# Patient Record
Sex: FEMALE | Race: BLACK OR AFRICAN AMERICAN | NOT HISPANIC OR LATINO | URBAN - METROPOLITAN AREA
[De-identification: names, ages, dates, MRNs, and addresses within clinical notes are randomized per-mention and may not be internally consistent; named-entity substitution may affect disease eponyms.]

---

## 2019-04-16 ENCOUNTER — INPATIENT (INPATIENT)
Facility: HOSPITAL | Age: 29
LOS: 2 days | Discharge: HOME | End: 2019-04-19
Attending: INTERNAL MEDICINE | Admitting: INTERNAL MEDICINE
Payer: COMMERCIAL

## 2019-04-16 VITALS
HEART RATE: 76 BPM | DIASTOLIC BLOOD PRESSURE: 58 MMHG | TEMPERATURE: 98 F | OXYGEN SATURATION: 94 % | RESPIRATION RATE: 20 BRPM | SYSTOLIC BLOOD PRESSURE: 113 MMHG

## 2019-04-16 DIAGNOSIS — Z90.49 ACQUIRED ABSENCE OF OTHER SPECIFIED PARTS OF DIGESTIVE TRACT: Chronic | ICD-10-CM

## 2019-04-16 DIAGNOSIS — Z90.81 ACQUIRED ABSENCE OF SPLEEN: Chronic | ICD-10-CM

## 2019-04-16 LAB
ABO RH CONFIRMATION: SIGNIFICANT CHANGE UP
ALBUMIN SERPL ELPH-MCNC: 4.6 G/DL — SIGNIFICANT CHANGE UP (ref 3.5–5.2)
ALP SERPL-CCNC: 81 U/L — SIGNIFICANT CHANGE UP (ref 30–115)
ALT FLD-CCNC: 99 U/L — HIGH (ref 0–41)
ANION GAP SERPL CALC-SCNC: 15 MMOL/L — HIGH (ref 7–14)
APPEARANCE UR: ABNORMAL
AST SERPL-CCNC: 84 U/L — HIGH (ref 0–41)
BACTERIA # UR AUTO: ABNORMAL /HPF
BILIRUB DIRECT SERPL-MCNC: 5.3 MG/DL — HIGH (ref 0–0.2)
BILIRUB INDIRECT FLD-MCNC: 5.1 MG/DL — HIGH (ref 0.2–1.2)
BILIRUB SERPL-MCNC: 10.4 MG/DL — HIGH (ref 0.2–1.2)
BILIRUB UR-MCNC: ABNORMAL
BLD GP AB SCN SERPL QL: SIGNIFICANT CHANGE UP
BUN SERPL-MCNC: 9 MG/DL — LOW (ref 10–20)
CALCIUM SERPL-MCNC: 9.3 MG/DL — SIGNIFICANT CHANGE UP (ref 8.5–10.1)
CHLORIDE SERPL-SCNC: 103 MMOL/L — SIGNIFICANT CHANGE UP (ref 98–110)
CO2 SERPL-SCNC: 22 MMOL/L — SIGNIFICANT CHANGE UP (ref 17–32)
COLOR SPEC: ABNORMAL
CREAT SERPL-MCNC: 0.5 MG/DL — LOW (ref 0.7–1.5)
DIFF PNL FLD: ABNORMAL
EPI CELLS # UR: ABNORMAL /HPF
GLUCOSE SERPL-MCNC: 88 MG/DL — SIGNIFICANT CHANGE UP (ref 70–99)
GLUCOSE UR QL: NEGATIVE MG/DL — SIGNIFICANT CHANGE UP
HCT VFR BLD CALC: 23.9 % — LOW (ref 37–47)
HGB BLD-MCNC: 8.7 G/DL — LOW (ref 12–16)
KETONES UR-MCNC: NEGATIVE — SIGNIFICANT CHANGE UP
LACTATE SERPL-SCNC: 0.8 MMOL/L — SIGNIFICANT CHANGE UP (ref 0.5–2.2)
LEUKOCYTE ESTERASE UR-ACNC: NEGATIVE — SIGNIFICANT CHANGE UP
MCHC RBC-ENTMCNC: 36.4 G/DL — SIGNIFICANT CHANGE UP (ref 32–37)
MCHC RBC-ENTMCNC: 37.2 PG — HIGH (ref 27–31)
MCV RBC AUTO: 102.1 FL — HIGH (ref 81–99)
NITRITE UR-MCNC: NEGATIVE — SIGNIFICANT CHANGE UP
NRBC # BLD: 8 /100 WBCS — HIGH (ref 0–0)
PH UR: 6.5 — SIGNIFICANT CHANGE UP (ref 5–8)
PLATELET # BLD AUTO: 437 K/UL — HIGH (ref 130–400)
POTASSIUM SERPL-MCNC: 4.7 MMOL/L — SIGNIFICANT CHANGE UP (ref 3.5–5)
POTASSIUM SERPL-SCNC: 4.7 MMOL/L — SIGNIFICANT CHANGE UP (ref 3.5–5)
PROT SERPL-MCNC: 6.9 G/DL — SIGNIFICANT CHANGE UP (ref 6–8)
PROT UR-MCNC: NEGATIVE MG/DL — SIGNIFICANT CHANGE UP
RBC # BLD: 2.34 M/UL — LOW (ref 4.2–5.4)
RBC # BLD: 2.34 M/UL — LOW (ref 4.2–5.4)
RBC # FLD: 18.7 % — HIGH (ref 11.5–14.5)
RBC CASTS # UR COMP ASSIST: ABNORMAL /HPF
RETICS #: 572.6 K/UL — HIGH (ref 25–125)
RETICS/RBC NFR: 24.5 % — HIGH (ref 0.5–1.5)
SODIUM SERPL-SCNC: 140 MMOL/L — SIGNIFICANT CHANGE UP (ref 135–146)
SP GR SPEC: 1.01 — SIGNIFICANT CHANGE UP (ref 1.01–1.03)
TYPE + AB SCN PNL BLD: SIGNIFICANT CHANGE UP
UROBILINOGEN FLD QL: >=8 MG/DL (ref 0.2–0.2)
WBC # BLD: 10.97 K/UL — HIGH (ref 4.8–10.8)
WBC # FLD AUTO: 10.97 K/UL — HIGH (ref 4.8–10.8)
WBC UR QL: SIGNIFICANT CHANGE UP /HPF

## 2019-04-16 PROCEDURE — 99285 EMERGENCY DEPT VISIT HI MDM: CPT

## 2019-04-16 PROCEDURE — 76705 ECHO EXAM OF ABDOMEN: CPT | Mod: 26

## 2019-04-16 PROCEDURE — 93010 ELECTROCARDIOGRAM REPORT: CPT

## 2019-04-16 PROCEDURE — 71046 X-RAY EXAM CHEST 2 VIEWS: CPT | Mod: 26

## 2019-04-16 RX ORDER — SODIUM CHLORIDE 9 MG/ML
1000 INJECTION, SOLUTION INTRAVENOUS
Qty: 0 | Refills: 0 | Status: DISCONTINUED | OUTPATIENT
Start: 2019-04-16 | End: 2019-04-17

## 2019-04-16 RX ORDER — SODIUM CHLORIDE 9 MG/ML
1000 INJECTION INTRAMUSCULAR; INTRAVENOUS; SUBCUTANEOUS ONCE
Qty: 0 | Refills: 0 | Status: COMPLETED | OUTPATIENT
Start: 2019-04-16 | End: 2019-04-16

## 2019-04-16 RX ORDER — KETOROLAC TROMETHAMINE 30 MG/ML
15 SYRINGE (ML) INJECTION ONCE
Qty: 0 | Refills: 0 | Status: DISCONTINUED | OUTPATIENT
Start: 2019-04-16 | End: 2019-04-16

## 2019-04-16 RX ADMIN — SODIUM CHLORIDE 1000 MILLILITER(S): 9 INJECTION INTRAMUSCULAR; INTRAVENOUS; SUBCUTANEOUS at 21:00

## 2019-04-16 RX ADMIN — SODIUM CHLORIDE 100 MILLILITER(S): 9 INJECTION, SOLUTION INTRAVENOUS at 22:05

## 2019-04-16 RX ADMIN — SODIUM CHLORIDE 1000 MILLILITER(S): 9 INJECTION INTRAMUSCULAR; INTRAVENOUS; SUBCUTANEOUS at 20:00

## 2019-04-16 RX ADMIN — Medication 15 MILLIGRAM(S): at 23:35

## 2019-04-16 NOTE — ED PROVIDER NOTE - PROGRESS NOTE DETAILS
Care d/w Heme fellow and requesting IV fluids and analgesia as needed. Dr Faizan mitchell. Hospitalist and MAR aware. Dr Tran requesting RUQ US and will f/u by Med team

## 2019-04-16 NOTE — H&P ADULT - NSHPLABSRESULTS_GEN_ALL_CORE
8.7    10.97  )----------(  437       ( 16 Apr 2019 18:18 )               23.9     Retic Count: 24.5      04-16    140  |  103  |  9<L>  ----------------------------<  88  4.7   |  22  |  0.5<L>    Ca    9.3      16 Apr 2019 18:18    TPro  6.9  /  Alb  4.6  /  TBili  10.4<H>  /  DBili  5.3<H>  /  AST  84<H>  /  ALT  99<H>  /  AlkPhos  81  04-16

## 2019-04-16 NOTE — ED PROVIDER NOTE - CLINICAL SUMMARY MEDICAL DECISION MAKING FREE TEXT BOX
28f w HbSS and new jaundice concerning for hemolytic crisis. nontoxic appearing, n/v intact. No pain at this time. Labs, EKG, & imaging reviewed. IV fluids given. Hematology consulted. Patient admitted for further care and management.

## 2019-04-16 NOTE — ED PROVIDER NOTE - CARE PLAN
Principal Discharge DX:	Jaundice  Secondary Diagnosis:	Hb-SS disease with crisis Principal Discharge DX:	Jaundice  Assessment and plan of treatment:	28f w HbSS and new jaundice concerning for hemolytic crisis. nontoxic appearing, n/v intact. No pain at this time. Hx of cholecystectomy & no abd tender/pain. --Labs, EKG, CXR. --Will d/w Hematology, admit  Secondary Diagnosis:	Hb-SS disease with crisis

## 2019-04-16 NOTE — ED PROVIDER NOTE - OBJECTIVE STATEMENT
28f w a hx of HbSS reports 1wk of jaundice while traveling to [place]. Pt also reporting feeling tired, dark urine, and generalized weakness. Symptoms are moderate, constant, no exacerbating/alleviating. No similar prior.

## 2019-04-16 NOTE — H&P ADULT - ASSESSMENT
Patient is a 29 yo F with PMHx of Sickle Cell Disease presenting with complaints of painless jaundice.     #) Patient is a 29 yo F with PMHx of Sickle Cell Disease presenting with complaints of painless jaundice.     #) Jaundice likely secondary to Acute Sickle Crisis without Vaso-occlusive symptoms  - Patient presenting with diffuse jaundice and total T bili of 10 with elevation in both direct and indirect bili  - Retic percent of 24.5 with absolute retic count 13.9 showing adequate response   - US of liver preliminarily shows no acute findngs, follow up official read   - Aggressive fluid hydration   - Hematology evaluation   - Monitor hemoglobin   - Maintain Active type and screen    #Diet; Regular  #Activity: Ambulate as tolerated  #DVT PPx: Encourage Ambulation   #GI PPx: Not indicated   #Dispo: Pending

## 2019-04-16 NOTE — ED ADULT TRIAGE NOTE - CHIEF COMPLAINT QUOTE
pt here medical evaluatio hx of Sickle Cell. recently travelled from [facility] arrived on Saturday. Urine is dark as per pt and eyes appear jaundiced in ER. denies n/v . reports constipation. pt here medical evaluation hx of Sickle Cell. recently travelled from [place] arrived on Saturday. Urine is dark as per pt and eyes appear jaundiced in ER. denies n/v/d/fevers. reports constipation.

## 2019-04-16 NOTE — ED PROVIDER NOTE - PLAN OF CARE
28f w HbSS and new jaundice concerning for hemolytic crisis. nontoxic appearing, n/v intact. No pain at this time. Hx of cholecystectomy & no abd tender/pain. --Labs, EKG, CXR. --Will d/w Hematology, admit

## 2019-04-16 NOTE — H&P ADULT - ATTENDING COMMENTS
Patient is a 27 yo F with PMHx of Sickle Cell Disease presenting with complaints of painless jaundice. Per patient, she recently went on a trip to [facility]. She was there for about 6 days. She says that when she first arrived, she began to notice she had some mild yellowing of her eyes which she has had before. She did not think much of it but notes she was progressively getting more fatigued throughout her trip. She then came back and began to note that her eyes became even more yellow in color as well as her skin. She also notes very dark colored urine but otherwise denies any urinary complaints. She also began to have pruritis. Therefore, she came in for further evaluation. On arrival to the ED, she was found on blood work to have a Hb of 8.7, retic count of 24.5, Total Bili of 10 with both Indirect and Direct Bili of approx 5. She denies any abdominal pain, fevers, chills, chest pain, cough, wheezing, shortness of breath or any other complaints except for some jaw pain which she contributes to her TMJ. She denies any sort of tick or mosquito bite that she is aware of while traveling.     REVIEW OF SYSTEMS: see cc/HPI  CONSTITUTIONAL: No weakness, fevers or chills  EYES/ENT: No visual changes;  No vertigo or throat pain , Scleral juandice - intensified   NECK: No pain or stiffness  RESPIRATORY: No cough, wheezing, hemoptysis; No shortness of breath  CARDIOVASCULAR: No chest pain or palpitations  GASTROINTESTINAL: No abdominal or epigastric pain. No nausea, vomiting, or hematemesis; No diarrhea or constipation. No melena or hematochezia.  GENITOURINARY: No dysuria, frequency or hematuria  NEUROLOGICAL: No numbness or weakness  SKIN: No itching, rashes    Physical Exam:  General: WN/WD NAD  Neurology: A&Ox3, nonfocal, follows commands  Eyes: PERRLA/ EOMI, Scleral jaundice  ENT/Neck: Neck supple, trachea midline, No JVD  Respiratory: CTA B/L, No wheezing, rales, rhonchi  CV: Normal rate regular rhythm, S1S2, no murmurs, rubs or gallops  Abdominal: Soft, NT, ND +BS,   Extremities: No edema, + peripheral pulses  Skin: No Rashes, Hematoma, Ecchymosis  Incisions:   Tubes:  A/P  Elevated Bilirubin in patient w/ HbSS r/o hemolysis   -IV fluids   -serial CBC / Retic / Peripheral smear  -LDH/ Haptoglobin / Type and Screen  -Hematology eval    Macrocytosis - possibly 2/2 high retic % r/o Vit B12 def  -B12 level    DVT prophylaxis

## 2019-04-16 NOTE — ED ADULT NURSE NOTE - OBJECTIVE STATEMENT
Pt complains of dark colored urine and yellow sclera x1 week. Pt has a history of sickle  cell anemia and also complains of feeling tired. [place] on [place].

## 2019-04-16 NOTE — ED PROVIDER NOTE - NS ED ROS FT
Review of Systems  Constitutional:  No fever or chills. +Generalized weakness  Eyes:  No visual changes, eye pain, or discharge.  ENMT:  No nasal congestion, discharge, or throat pain.   Cardiac:  No chest pain, syncope, or edema.  Respiratory:  No dyspnea, wheezing, or cough. No hemoptysis.  GI:  No vomiting, diarrhea, or abdominal pain. No melena or hematochezia.  :  No dysuria or hematuria.   Musculoskeletal:  No gait abnormality, joint swelling, joint pain, or back pain.  Skin:  +Jaundice  Neuro:  No headache, loss of sensation, or focal weakness.  No change in mental status.

## 2019-04-16 NOTE — H&P ADULT - HISTORY OF PRESENT ILLNESS
Patient is a 29 yo F with PMHx of Sickle Cell Disease presenting with complaints of painless jaundice. Per patient, she recently went on a trip to [facility]. She was there for about 6 days. She says that when she first arrived, she began to notice she had some mild yellowing of her eyes which she has had before. She did not think much of it but notes she was progressively getting more fatigued throughout her trip. She then came back and began to note that her eyes became even more yellow in color as well as her skin. She also notes very dark colored urine but otherwise denies any urinary complaints. She also began to have pruritis. Therefore, she came in for further evaluation. On arrival to the ED, she was found on blood work to have a Hb of 8.7, retic count of 24.5, Total Bili of 10 with both Indirect and Direct Bili of approx 5. She denies any abdominal pain, fevers, chills, chest pain, cough, wheezing, shortness of breath or any other complaints except for some jaw pain which she contributes to her TMJ. She denies any sort of tick or mosquito bite that she is aware of while traveling.

## 2019-04-16 NOTE — H&P ADULT - NSHPPHYSICALEXAM_GEN_ALL_CORE
T(C): 36.8 (16 Apr 2019 21:15), Max: 36.8 (16 Apr 2019 21:15)  T(F): 98.2 (16 Apr 2019 21:15), Max: 98.2 (16 Apr 2019 21:15)  HR: 70 (16 Apr 2019 21:15) (65 - 76)  BP: 117/69 (16 Apr 2019 21:15) (113/58 - 137/65)  RR: 18 (16 Apr 2019 21:15) (18 - 20)  SpO2: 98% (16 Apr 2019 21:15) (94% - 98%)    General: well developed, well nourished, NAD  Neuro: alert and oriented, no focal deficits, moves all extremities spontaneously  HEENT: NCAT, EOMI, anicteric, mucosa moist  Respiratory: airway patent, respirations unlabored  CVS: regular rate and rhythm  Abdomen: soft, nontender, nondistended  Extremities: no edema, sensation and movement grossly intact  Skin: diffuse jaundice T(C): 36.8 (16 Apr 2019 21:15), Max: 36.8 (16 Apr 2019 21:15)  T(F): 98.2 (16 Apr 2019 21:15), Max: 98.2 (16 Apr 2019 21:15)  HR: 70 (16 Apr 2019 21:15) (65 - 76)  BP: 117/69 (16 Apr 2019 21:15) (113/58 - 137/65)  RR: 18 (16 Apr 2019 21:15) (18 - 20)  SpO2: 98% (16 Apr 2019 21:15) (94% - 98%)    General: well developed, well nourished, NAD  Neuro: alert and oriented, no focal deficits, moves all extremities spontaneously  HEENT: NCAT, EOMI, anicteric, mucosa moist  Respiratory: CTABL   CVS: regular rate and rhythm  Abdomen: soft, nontender, nondistended  Extremities: no edema, sensation and movement grossly intact  Skin: diffuse jaundice

## 2019-04-16 NOTE — ED PROVIDER NOTE - PHYSICAL EXAMINATION
Physical Exam  General: Awake, alert, NAD, WDWN, non-toxic appearing, NCAT  Eyes: PERRL, EOMI, +scleral icterus, lids are normal  ENT: External inspection normal, pink/moist membranes, no pharyngeal erythema/exudate  CV: S1S2, regular rate and rhythm, no murmur/gallops/rubs, no JVD, 2+ pulses b/l, no edema/cords/homans, warm/well-perfused  Respiratory: Normal respiratory rate/effort, no respiratory distress, normal voice, speaking full sentences, lungs clear to auscultation b/l, no wheezing/rales/rhonchi, no retractions, no stridor  Abdomen: Soft abdomen, no tender/distended/guarding/rebound, no CVA tender, neg Maxwell.  Musculoskeletal: FROM all 4 extremities, N/V intact, no cas tender/deform  Neck: FROM neck, supple, no meningismus, trachea midline, no JVD  Integumentary: Color normal for race, warm and dry, +scleral icterus  Neuro: Oriented x3, CN 2-12 grossly intact, normal motor, normal sensory  Psych: Oriented x3, mood normal, affect normal

## 2019-04-17 LAB
ALBUMIN SERPL ELPH-MCNC: 3.8 G/DL — SIGNIFICANT CHANGE UP (ref 3.5–5.2)
ALBUMIN SERPL ELPH-MCNC: 3.8 G/DL — SIGNIFICANT CHANGE UP (ref 3.5–5.2)
ALP SERPL-CCNC: 70 U/L — SIGNIFICANT CHANGE UP (ref 30–115)
ALP SERPL-CCNC: 72 U/L — SIGNIFICANT CHANGE UP (ref 30–115)
ALT FLD-CCNC: 79 U/L — HIGH (ref 0–41)
ALT FLD-CCNC: 83 U/L — HIGH (ref 0–41)
ANION GAP SERPL CALC-SCNC: 12 MMOL/L — SIGNIFICANT CHANGE UP (ref 7–14)
ANION GAP SERPL CALC-SCNC: 13 MMOL/L — SIGNIFICANT CHANGE UP (ref 7–14)
AST SERPL-CCNC: 70 U/L — HIGH (ref 0–41)
AST SERPL-CCNC: 80 U/L — HIGH (ref 0–41)
BASOPHILS # BLD AUTO: 0.06 K/UL — SIGNIFICANT CHANGE UP (ref 0–0.2)
BASOPHILS NFR BLD AUTO: 0.6 % — SIGNIFICANT CHANGE UP (ref 0–1)
BILIRUB DIRECT SERPL-MCNC: 4.9 MG/DL — HIGH (ref 0–0.2)
BILIRUB DIRECT SERPL-MCNC: 5.1 MG/DL — HIGH (ref 0–0.2)
BILIRUB INDIRECT FLD-MCNC: 4.4 MG/DL — HIGH (ref 0.2–1.2)
BILIRUB INDIRECT FLD-MCNC: 5.5 MG/DL — HIGH (ref 0.2–1.2)
BILIRUB SERPL-MCNC: 10.6 MG/DL — HIGH (ref 0.2–1.2)
BILIRUB SERPL-MCNC: 9.3 MG/DL — HIGH (ref 0.2–1.2)
BUN SERPL-MCNC: 8 MG/DL — LOW (ref 10–20)
BUN SERPL-MCNC: 9 MG/DL — LOW (ref 10–20)
CALCIUM SERPL-MCNC: 8.5 MG/DL — SIGNIFICANT CHANGE UP (ref 8.5–10.1)
CALCIUM SERPL-MCNC: 8.9 MG/DL — SIGNIFICANT CHANGE UP (ref 8.5–10.1)
CHLORIDE SERPL-SCNC: 105 MMOL/L — SIGNIFICANT CHANGE UP (ref 98–110)
CHLORIDE SERPL-SCNC: 106 MMOL/L — SIGNIFICANT CHANGE UP (ref 98–110)
CO2 SERPL-SCNC: 20 MMOL/L — SIGNIFICANT CHANGE UP (ref 17–32)
CO2 SERPL-SCNC: 21 MMOL/L — SIGNIFICANT CHANGE UP (ref 17–32)
CREAT SERPL-MCNC: 0.5 MG/DL — LOW (ref 0.7–1.5)
CREAT SERPL-MCNC: 0.6 MG/DL — LOW (ref 0.7–1.5)
EOSINOPHIL # BLD AUTO: 0.28 K/UL — SIGNIFICANT CHANGE UP (ref 0–0.7)
EOSINOPHIL NFR BLD AUTO: 3 % — SIGNIFICANT CHANGE UP (ref 0–8)
GLUCOSE SERPL-MCNC: 101 MG/DL — HIGH (ref 70–99)
GLUCOSE SERPL-MCNC: 106 MG/DL — HIGH (ref 70–99)
HCT VFR BLD CALC: 21.4 % — LOW (ref 37–47)
HCT VFR BLD CALC: 23 % — LOW (ref 37–47)
HGB BLD-MCNC: 7.7 G/DL — LOW (ref 12–16)
HGB BLD-MCNC: 8.2 G/DL — LOW (ref 12–16)
IMM GRANULOCYTES NFR BLD AUTO: 1 % — HIGH (ref 0.1–0.3)
LYMPHOCYTES # BLD AUTO: 3.16 K/UL — SIGNIFICANT CHANGE UP (ref 1.2–3.4)
LYMPHOCYTES # BLD AUTO: 33.8 % — SIGNIFICANT CHANGE UP (ref 20.5–51.1)
MCHC RBC-ENTMCNC: 35.7 G/DL — SIGNIFICANT CHANGE UP (ref 32–37)
MCHC RBC-ENTMCNC: 36 G/DL — SIGNIFICANT CHANGE UP (ref 32–37)
MCHC RBC-ENTMCNC: 36.3 PG — HIGH (ref 27–31)
MCHC RBC-ENTMCNC: 36.8 PG — HIGH (ref 27–31)
MCV RBC AUTO: 101.8 FL — HIGH (ref 81–99)
MCV RBC AUTO: 102.4 FL — HIGH (ref 81–99)
MONOCYTES # BLD AUTO: 0.65 K/UL — HIGH (ref 0.1–0.6)
MONOCYTES NFR BLD AUTO: 7 % — SIGNIFICANT CHANGE UP (ref 1.7–9.3)
NEUTROPHILS # BLD AUTO: 5.1 K/UL — SIGNIFICANT CHANGE UP (ref 1.4–6.5)
NEUTROPHILS NFR BLD AUTO: 54.6 % — SIGNIFICANT CHANGE UP (ref 42.2–75.2)
NRBC # BLD: 6 /100 WBCS — HIGH (ref 0–0)
NRBC # BLD: 8 /100 WBCS — HIGH (ref 0–0)
PLATELET # BLD AUTO: 372 K/UL — SIGNIFICANT CHANGE UP (ref 130–400)
PLATELET # BLD AUTO: 377 K/UL — SIGNIFICANT CHANGE UP (ref 130–400)
POTASSIUM SERPL-MCNC: 4.2 MMOL/L — SIGNIFICANT CHANGE UP (ref 3.5–5)
POTASSIUM SERPL-MCNC: 4.6 MMOL/L — SIGNIFICANT CHANGE UP (ref 3.5–5)
POTASSIUM SERPL-SCNC: 4.2 MMOL/L — SIGNIFICANT CHANGE UP (ref 3.5–5)
POTASSIUM SERPL-SCNC: 4.6 MMOL/L — SIGNIFICANT CHANGE UP (ref 3.5–5)
PROT SERPL-MCNC: 5.8 G/DL — LOW (ref 6–8)
PROT SERPL-MCNC: 6.2 G/DL — SIGNIFICANT CHANGE UP (ref 6–8)
RBC # BLD: 2.09 M/UL — LOW (ref 4.2–5.4)
RBC # BLD: 2.26 M/UL — LOW (ref 4.2–5.4)
RBC # BLD: 2.26 M/UL — LOW (ref 4.2–5.4)
RBC # FLD: 18.2 % — HIGH (ref 11.5–14.5)
RBC # FLD: 18.2 % — HIGH (ref 11.5–14.5)
RETICS #: 529.3 K/UL — HIGH (ref 25–125)
RETICS/RBC NFR: 23.4 % — HIGH (ref 0.5–1.5)
SODIUM SERPL-SCNC: 137 MMOL/L — SIGNIFICANT CHANGE UP (ref 135–146)
SODIUM SERPL-SCNC: 140 MMOL/L — SIGNIFICANT CHANGE UP (ref 135–146)
WBC # BLD: 11.18 K/UL — HIGH (ref 4.8–10.8)
WBC # BLD: 9.34 K/UL — SIGNIFICANT CHANGE UP (ref 4.8–10.8)
WBC # FLD AUTO: 11.18 K/UL — HIGH (ref 4.8–10.8)
WBC # FLD AUTO: 9.34 K/UL — SIGNIFICANT CHANGE UP (ref 4.8–10.8)

## 2019-04-17 RX ORDER — KETOROLAC TROMETHAMINE 30 MG/ML
15 SYRINGE (ML) INJECTION ONCE
Qty: 0 | Refills: 0 | Status: DISCONTINUED | OUTPATIENT
Start: 2019-04-17 | End: 2019-04-17

## 2019-04-17 RX ORDER — ENOXAPARIN SODIUM 100 MG/ML
40 INJECTION SUBCUTANEOUS DAILY
Qty: 0 | Refills: 0 | Status: DISCONTINUED | OUTPATIENT
Start: 2019-04-17 | End: 2019-04-17

## 2019-04-17 RX ORDER — SENNA PLUS 8.6 MG/1
2 TABLET ORAL AT BEDTIME
Qty: 0 | Refills: 0 | Status: DISCONTINUED | OUTPATIENT
Start: 2019-04-17 | End: 2019-04-19

## 2019-04-17 RX ORDER — DOCUSATE SODIUM 100 MG
100 CAPSULE ORAL THREE TIMES A DAY
Qty: 0 | Refills: 0 | Status: DISCONTINUED | OUTPATIENT
Start: 2019-04-17 | End: 2019-04-19

## 2019-04-17 RX ADMIN — Medication 100 MILLIGRAM(S): at 15:17

## 2019-04-17 RX ADMIN — SODIUM CHLORIDE 100 MILLILITER(S): 9 INJECTION, SOLUTION INTRAVENOUS at 05:23

## 2019-04-17 RX ADMIN — Medication 15 MILLIGRAM(S): at 10:11

## 2019-04-17 NOTE — PROGRESS NOTE ADULT - SUBJECTIVE AND OBJECTIVE BOX
ALEIDA LUCIA 28y Female  MRN#: [id number]   CODE STATUS:________      SUBJECTIVE  HPI    Overnight events     Subjective complaints     Present Today:   - [place]  - Drains   - [place] :                                             ----------------------------------------------------------  OBJECTIVE  PAST MEDICAL & SURGICAL HISTORY  Sickle cell anemia  History of cholecystectomy  H/O splenectomy                                            -----------------------------------------------------------  ALLERGIES:  No Known Allergies                                            ------------------------------------------------------------  MEDICATIONS:  STANDING MEDICATIONS    PRN MEDICATIONS                                            ------------------------------------------------------------  VITAL SIGNS: Last 24 Hours  T(C): 36.3 (2019 12:30), Max: 36.9 (2019 23:28)  T(F): 97.4 (2019 12:30), Max: 98.4 (2019 23:28)  HR: 63 (2019 12:30) (63 - 76)  BP: 118/66 (2019 12:30) (98/55 - 137/65)  BP(mean): --  RR: 18 (2019 12:30) (18 - 20)  SpO2: 98% (2019 23:28) (94% - 98%)                                             --------------------------------------------------------------  LABS:                        8.2    9.34  )-----------( 372      ( 2019 08:12 )             23.0         137  |  105  |  9<L>  ----------------------------<  101<H>  4.6   |  20  |  0.5<L>    Ca    8.9      2019 08:12    TPro  6.2  /  Alb  3.8  /  TBili  10.6<H>  /  DBili  5.1<H>  /  AST  80<H>  /  ALT  83<H>  /  AlkPhos  72        Urinalysis Basic - ( 2019 19:14 )    Color: Orange / Appearance: Cloudy / S.015 / pH: x  Gluc: x / Ketone: Negative  / Bili: Moderate / Urobili: >=8.0 mg/dL   Blood: x / Protein: Negative mg/dL / Nitrite: Negative   Leuk Esterase: Negative / RBC: 3-5 /HPF / WBC 1-2 /HPF   Sq Epi: x / Non Sq Epi: Moderate /HPF / Bacteria: Few /HPF    Lactate, Blood: 0.8 mmol/L (19 @ 18:18                                              -------------------------------------------------------------  RADIOLOGY:  < from: US Abdomen Limited (19 @ 22:40) >    IMPRESSION:    Post cholecystectomy. Otherwise, unremarkable right upper quadrant   sonogram.                                            --------------------------------------------------------------    PHYSICAL EXAM:    GENERAL: NAD, well-developed, AAOx3  HEENT:  Atraumatic, Normocephalic. PERRLA, icteric sclera, No JVD  PULMONARY: Clear to auscultation bilaterally; No wheeze  CARDIOVASCULAR: Regular rate and rhythm; No murmurs, rubs, or gallops; tenderness+ right breast  GASTROINTESTINAL: Soft, Nontender, Nondistended; Bowel sounds present  MUSCULOSKELETAL:  2+ Peripheral Pulses, No clubbing, cyanosis, or edema  NEUROLOGY: non-focal  SKIN: No rashes or lesions                                           --------------------------------------------------------------    ASSESSMENT & PLAN    Patient is a 27 yo F with PMHx of Sickle Cell Disease presenting with complaints of painless jaundice.     #Jaundice likely secondary to Acute Sickle Crisis without Vaso-occlusive symptoms   Patient presenting with diffuse jaundice and total T bili of 10 with elevation in both direct and indirect bili  - Retic percent of 24.5 with absolute retic count 13.9 showing adequate response   - US of liver preliminarily shows no acute findngs, follow up official read   - Aggressive fluid hydration   - Hematology evaluation   - Monitor hemoglobin   - Maintain Active type and screen    #DVT prophylaxis Ambulating well  #GI prophylaxis Not indicated  #Diet Regular  #Activity AAT  #Code status FULL  #Disposition home pending hem/onc recommendations                                                                             -------------------------------------------------------- ALEIDA LUCIA 28y Female  MRN#: [id number]   CODE STATUS: FULL      SUBJECTIVE    Overnight events - None    Subjective complaints - Pt was lying comfortably in bed today. Pt complained of the yellowing of eyes which was still the same. Pt also has dark colored urine unchanged from before. Pt also complains of right breast tenderness.       OBJECTIVE  PAST MEDICAL & SURGICAL HISTORY  Sickle cell anemia  History of cholecystectomy  H/O splenectomy                                            -----------------------------------------------------------  ALLERGIES:  No Known Allergies                                            ------------------------------------------------------------  MEDICATIONS:  Home Medications:  None                                                ------------------------------------------------------------  VITAL SIGNS: Last 24 Hours  T(C): 36.3 (2019 12:30), Max: 36.9 (2019 23:28)  T(F): 97.4 (2019 12:30), Max: 98.4 (2019 23:28)  HR: 63 (2019 12:30) (63 - 76)  BP: 118/66 (2019 12:30) (98/55 - 137/65)  RR: 18 (2019 12:30) (18 - 20)  SpO2: 98% (2019 23:28) (94% - 98%)                                             --------------------------------------------------------------  LABS:                        8.2    9.34  )-----------( 372      ( 2019 08:12 )             23.0     04-17    137  |  105  |  9<L>  ----------------------------<  101<H>  4.6   |  20  |  0.5<L>    Ca    8.9      2019 08:12    TPro  6.2  /  Alb  3.8  /  TBili  10.6<H>  /  DBili  5.1<H>  /  AST  80<H>  /  ALT  83<H>  /  AlkPhos  72      Reticulocyte Count in AM (19 @ 08:12)    RBC Count: 2.26 M/uL    Reticulocyte Percent: 23.4 %    Absolute Reticulocytes: 529.3 K/uL    Urinalysis Basic - ( 2019 19:14 )    Color: Orange / Appearance: Cloudy / S.015 / pH: x  Gluc: x / Ketone: Negative  / Bili: Moderate / Urobili: >=8.0 mg/dL   Blood: x / Protein: Negative mg/dL / Nitrite: Negative   Leuk Esterase: Negative / RBC: 3-5 /HPF / WBC 1-2 /HPF   Sq Epi: x / Non Sq Epi: Moderate /HPF / Bacteria: Few /HPF    Lactate, Blood: 0.8 mmol/L (19 @ 18:18                                              -------------------------------------------------------------  RADIOLOGY:  < from: US Abdomen Limited (19 @ 22:40) >    IMPRESSION:    Post cholecystectomy. Otherwise, unremarkable right upper quadrant   sonogram.                                            --------------------------------------------------------------    PHYSICAL EXAM:    GENERAL: NAD, well-developed, AAOx3  HEENT:  Atraumatic, Normocephalic. PERRLA, icteric sclera, No JVD  PULMONARY: Clear to auscultation bilaterally; No wheeze  CARDIOVASCULAR: Regular rate and rhythm; No murmurs, rubs, or gallops; tenderness+ right breast  GASTROINTESTINAL: Soft, Nontender, Nondistended; Bowel sounds present  MUSCULOSKELETAL:  2+ Peripheral Pulses, No clubbing, cyanosis, or edema  NEUROLOGY: non-focal  SKIN: No rashes or lesions                                           --------------------------------------------------------------    ASSESSMENT & PLAN    Patient is a 29 yo F with PMHx of Sickle Cell Disease presenting with complaints of painless jaundice.     #Jaundice likely secondary to Acute Sickle Crisis without Vaso-occlusive symptoms  -TPro  6.2  /  Alb  3.8  /  TBili  10.6<H>  /  DBili  5.1<H>  /  AST  80<H>  /  ALT  83<H>  /  AlkPhos  72  04-17  -Reticulocyte Percent: 23.4 %; Absolute Reticulocytes: 529.3 K/uL  -US of liver - unremarkable  -s/p Iv fluid hydration  -Hematology eval pending- f/u recs (spoke with Dr Gloria's office; her partner will see the pt today)    #Rt breast tenderness  -Possible Outpt follow up in breast clinic for US breast    #DVT prophylaxis Ambulating well  #GI prophylaxis Not indicated  #Diet Regular  #Activity AAT  #Code status FULL  #Disposition home pending hem/onc recommendations                                                                             --------------------------------------------------------

## 2019-04-18 LAB
ALBUMIN SERPL ELPH-MCNC: 4.3 G/DL — SIGNIFICANT CHANGE UP (ref 3.5–5.2)
ALP SERPL-CCNC: 82 U/L — SIGNIFICANT CHANGE UP (ref 30–115)
ALT FLD-CCNC: 84 U/L — HIGH (ref 0–41)
ANION GAP SERPL CALC-SCNC: 15 MMOL/L — HIGH (ref 7–14)
ANISOCYTOSIS BLD QL: SLIGHT — SIGNIFICANT CHANGE UP
AST SERPL-CCNC: 78 U/L — HIGH (ref 0–41)
BASOPHILS # BLD AUTO: 0.17 K/UL — SIGNIFICANT CHANGE UP (ref 0–0.2)
BASOPHILS NFR BLD AUTO: 1.7 % — HIGH (ref 0–1)
BILIRUB DIRECT SERPL-MCNC: 6.1 MG/DL — HIGH (ref 0–0.2)
BILIRUB INDIRECT FLD-MCNC: 6.1 MG/DL — HIGH (ref 0.2–1.2)
BILIRUB SERPL-MCNC: 12.2 MG/DL — HIGH (ref 0.2–1.2)
BUN SERPL-MCNC: 9 MG/DL — LOW (ref 10–20)
CALCIUM SERPL-MCNC: 9.5 MG/DL — SIGNIFICANT CHANGE UP (ref 8.5–10.1)
CHLORIDE SERPL-SCNC: 103 MMOL/L — SIGNIFICANT CHANGE UP (ref 98–110)
CO2 SERPL-SCNC: 22 MMOL/L — SIGNIFICANT CHANGE UP (ref 17–32)
CREAT SERPL-MCNC: 0.7 MG/DL — SIGNIFICANT CHANGE UP (ref 0.7–1.5)
EOSINOPHIL # BLD AUTO: 0.44 K/UL — SIGNIFICANT CHANGE UP (ref 0–0.7)
EOSINOPHIL NFR BLD AUTO: 4.4 % — SIGNIFICANT CHANGE UP (ref 0–8)
GIANT PLATELETS BLD QL SMEAR: PRESENT — SIGNIFICANT CHANGE UP
GLUCOSE SERPL-MCNC: 106 MG/DL — HIGH (ref 70–99)
HAV IGM SER-ACNC: SIGNIFICANT CHANGE UP
HBV SURFACE AB SER-ACNC: REACTIVE
HBV SURFACE AG SER-ACNC: SIGNIFICANT CHANGE UP
HCT VFR BLD CALC: 24.6 % — LOW (ref 37–47)
HCV AB S/CO SERPL IA: 0.05 S/CO — SIGNIFICANT CHANGE UP (ref 0–0.99)
HCV AB SERPL-IMP: SIGNIFICANT CHANGE UP
HGB BLD-MCNC: 8.9 G/DL — LOW (ref 12–16)
LYMPHOCYTES # BLD AUTO: 2.28 K/UL — SIGNIFICANT CHANGE UP (ref 1.2–3.4)
LYMPHOCYTES # BLD AUTO: 22.8 % — SIGNIFICANT CHANGE UP (ref 20.5–51.1)
MACROCYTES BLD QL: SLIGHT — SIGNIFICANT CHANGE UP
MANUAL SMEAR VERIFICATION: SIGNIFICANT CHANGE UP
MCHC RBC-ENTMCNC: 36.2 G/DL — SIGNIFICANT CHANGE UP (ref 32–37)
MCHC RBC-ENTMCNC: 36.2 PG — HIGH (ref 27–31)
MCV RBC AUTO: 100 FL — HIGH (ref 81–99)
MONOCYTES # BLD AUTO: 0.35 K/UL — SIGNIFICANT CHANGE UP (ref 0.1–0.6)
MONOCYTES NFR BLD AUTO: 3.5 % — SIGNIFICANT CHANGE UP (ref 1.7–9.3)
MYELOCYTES NFR BLD: 0.9 % — HIGH (ref 0–0)
NEUTROPHILS # BLD AUTO: 5.87 K/UL — SIGNIFICANT CHANGE UP (ref 1.4–6.5)
NEUTROPHILS NFR BLD AUTO: 58.8 % — SIGNIFICANT CHANGE UP (ref 42.2–75.2)
NRBC # BLD: 9 /100 — HIGH (ref 0–0)
NRBC # BLD: SIGNIFICANT CHANGE UP /100 WBCS (ref 0–0)
PLAT MORPH BLD: NORMAL — SIGNIFICANT CHANGE UP
PLATELET # BLD AUTO: 416 K/UL — HIGH (ref 130–400)
POIKILOCYTOSIS BLD QL AUTO: SLIGHT — SIGNIFICANT CHANGE UP
POLYCHROMASIA BLD QL SMEAR: SIGNIFICANT CHANGE UP
POTASSIUM SERPL-MCNC: 4.6 MMOL/L — SIGNIFICANT CHANGE UP (ref 3.5–5)
POTASSIUM SERPL-SCNC: 4.6 MMOL/L — SIGNIFICANT CHANGE UP (ref 3.5–5)
PROT SERPL-MCNC: 6.8 G/DL — SIGNIFICANT CHANGE UP (ref 6–8)
RBC # BLD: 2.46 M/UL — LOW (ref 4.2–5.4)
RBC # BLD: 2.46 M/UL — LOW (ref 4.2–5.4)
RBC # FLD: 16.9 % — HIGH (ref 11.5–14.5)
RBC BLD AUTO: ABNORMAL
RETICS #: 516.6 K/UL — HIGH (ref 25–125)
RETICS/RBC NFR: 21 % — HIGH (ref 0.5–1.5)
SICKLE CELLS BLD QL SMEAR: SIGNIFICANT CHANGE UP
SODIUM SERPL-SCNC: 140 MMOL/L — SIGNIFICANT CHANGE UP (ref 135–146)
TARGETS BLD QL SMEAR: SLIGHT — SIGNIFICANT CHANGE UP
VARIANT LYMPHS # BLD: 7.9 % — HIGH (ref 0–5)
VIT B12 SERPL-MCNC: 765 PG/ML — SIGNIFICANT CHANGE UP (ref 232–1245)
WBC # BLD: 9.99 K/UL — SIGNIFICANT CHANGE UP (ref 4.8–10.8)
WBC # FLD AUTO: 9.99 K/UL — SIGNIFICANT CHANGE UP (ref 4.8–10.8)

## 2019-04-18 PROCEDURE — 74181 MRI ABDOMEN W/O CONTRAST: CPT | Mod: 26

## 2019-04-18 RX ORDER — SODIUM CHLORIDE 9 MG/ML
1000 INJECTION INTRAMUSCULAR; INTRAVENOUS; SUBCUTANEOUS
Qty: 0 | Refills: 0 | Status: DISCONTINUED | OUTPATIENT
Start: 2019-04-18 | End: 2019-04-19

## 2019-04-18 RX ORDER — FOLIC ACID 0.8 MG
1 TABLET ORAL DAILY
Qty: 0 | Refills: 0 | Status: DISCONTINUED | OUTPATIENT
Start: 2019-04-18 | End: 2019-04-19

## 2019-04-18 RX ORDER — FOLIC ACID 0.8 MG
1 TABLET ORAL
Qty: 15 | Refills: 0 | OUTPATIENT
Start: 2019-04-18 | End: 2019-05-02

## 2019-04-18 RX ORDER — METHOCARBAMOL 500 MG/1
1 TABLET, FILM COATED ORAL
Qty: 45 | Refills: 0
Start: 2019-04-18 | End: 2019-05-02

## 2019-04-18 RX ORDER — ACETAMINOPHEN 500 MG
650 TABLET ORAL ONCE
Qty: 0 | Refills: 0 | Status: COMPLETED | OUTPATIENT
Start: 2019-04-18 | End: 2019-04-18

## 2019-04-18 RX ORDER — METHOCARBAMOL 500 MG/1
500 TABLET, FILM COATED ORAL EVERY 8 HOURS
Qty: 0 | Refills: 0 | Status: DISCONTINUED | OUTPATIENT
Start: 2019-04-18 | End: 2019-04-19

## 2019-04-18 RX ORDER — KETOROLAC TROMETHAMINE 30 MG/ML
15 SYRINGE (ML) INJECTION ONCE
Qty: 0 | Refills: 0 | Status: DISCONTINUED | OUTPATIENT
Start: 2019-04-18 | End: 2019-04-18

## 2019-04-18 RX ADMIN — Medication 650 MILLIGRAM(S): at 15:08

## 2019-04-18 RX ADMIN — SODIUM CHLORIDE 75 MILLILITER(S): 9 INJECTION INTRAMUSCULAR; INTRAVENOUS; SUBCUTANEOUS at 09:54

## 2019-04-18 RX ADMIN — Medication 15 MILLIGRAM(S): at 11:22

## 2019-04-18 RX ADMIN — Medication 1 MILLIGRAM(S): at 11:22

## 2019-04-18 RX ADMIN — METHOCARBAMOL 500 MILLIGRAM(S): 500 TABLET, FILM COATED ORAL at 21:32

## 2019-04-18 NOTE — PROGRESS NOTE ADULT - SUBJECTIVE AND OBJECTIVE BOX
ALEIDA LUCIA 28y Female  MRN#: [id number]   CODE STATUS: FULL      SUBJECTIVE    Overnight events - None    Subjective complaints - As per pt, she is feeling okay. Still complains of yellowing of eyes, and dark urine, which did lighten up yesterday while she was on fluids. No other complaints.     OBJECTIVE  PAST MEDICAL & SURGICAL HISTORY  Sickle cell anemia  History of cholecystectomy  H/O splenectomy                                            -----------------------------------------------------------  ALLERGIES:  No Known Allergies                                            ------------------------------------------------------------  MEDICATIONS:  STANDING MEDICATIONS  docusate sodium 100 milliGRAM(s) Oral three times a day  folic acid 1 milliGRAM(s) Oral daily  senna 2 Tablet(s) Oral at bedtime                                              ------------------------------------------------------------  VITAL SIGNS: Last 24 Hours  T(C): 36.4 (2019 04:33), Max: 36.5 (2019 20:42)  T(F): 97.6 (2019 04:33), Max: 97.7 (2019 20:42)  HR: 79 (2019 04:33) (63 - 79)  BP: 96/54 (2019 04:33) (96/54 - 118/66)  RR: 18 (2019 04:33) (18 - 18)  SpO2: 98% (2019 20:07) (98% - 98%)                                             --------------------------------------------------------------  LABS:                        8.2    9.34  )-----------( 372      ( 2019 08:12 )             23.0         137  |  105  |  9<L>  ----------------------------<  101<H>  4.6   |  20  |  0.5<L>    Ca    8.9      2019 08:12    TPro  6.2  /  Alb  3.8  /  TBili  10.6<H>  /  DBili  5.1<H>  /  AST  80<H>  /  ALT  83<H>  /  AlkPhos  72      Reticulocyte Count in AM (19 @ 08:12)    RBC Count: 2.26 M/uL    Reticulocyte Percent: 23.4 %    Absolute Reticulocytes: 529.3 K/uL    Vitamin B12, Serum (19 @ 11:24)    Vitamin B12, Serum: 765 pg/mL    Urinalysis Basic - ( 2019 19:14 )    Color: Orange / Appearance: Cloudy / S.015 / pH: x  Gluc: x / Ketone: Negative  / Bili: Moderate / Urobili: >=8.0 mg/dL   Blood: x / Protein: Negative mg/dL / Nitrite: Negative   Leuk Esterase: Negative / RBC: 3-5 /HPF / WBC 1-2 /HPF   Sq Epi: x / Non Sq Epi: Moderate /HPF / Bacteria: Few /HPF      Culture - Blood (collected 2019 00:25)  Source: .Blood None  Preliminary Report (2019 06:00):    No growth to date.                                                -------------------------------------------------------------  RADIOLOGY:  < from: US Abdomen Limited (19 @ 22:40) >    IMPRESSION:    Post cholecystectomy. Otherwise, unremarkable right upper quadrant   sonogram.                                            --------------------------------------------------------------    PHYSICAL EXAM:    GENERAL: NAD, well-developed, AAOx3  HEENT:  Atraumatic, Normocephalic. PERRLA, icteric sclera, No JVD  PULMONARY: Clear to auscultation bilaterally; No wheeze  CARDIOVASCULAR: Regular rate and rhythm; No murmurs, rubs, or gallops; tenderness+ right breast  GASTROINTESTINAL: Soft, Nontender, Nondistended; Bowel sounds present  MUSCULOSKELETAL:  2+ Peripheral Pulses, No clubbing, cyanosis, or edema  NEUROLOGY: non-focal  SKIN: No rashes or lesions                                           --------------------------------------------------------------    ASSESSMENT & PLAN    Patient is a 27 yo F with PMHx of Sickle Cell Disease presenting with complaints of painless jaundice.     #Jaundice likely secondary to Acute Sickle Crisis without Vaso-occlusive symptoms  -TPro  6.2  /  Alb  3.8  /  TBili  10.6<H>  /  DBili  5.1<H>  /  AST  80<H>  /  ALT  83<H>  /  AlkPhos  72  -  -Reticulocyte Percent: 23.4 %; Absolute Reticulocytes: 529.3 K/uL  -US of liver - unremarkable  -Iv fluid hydration  -Hematology eval noted and appreciated- jaundice likely drug induced from patch for birth control. Consider gyn evaluation. IV hydration. Folic acid levels and start folate.     #Rt breast tenderness  -Possible Outpt follow up in breast clinic for US breast    #DVT prophylaxis Ambulating well  #GI prophylaxis Not indicated  #Diet Regular  #Activity AAT  #Code status FULL  #Disposition home -likely today with OP gyn and hem/onc follow up                                                                             -------------------------------------------------------- ALEIDA LUCIA 28y Female  MRN#: [id number]   CODE STATUS: FULL      SUBJECTIVE    Overnight events - None    Subjective complaints - As per pt, she is feeling okay. Still complains of yellowing of eyes, and dark urine, which did lighten up yesterday while she was on fluids. No other complaints.     OBJECTIVE  PAST MEDICAL & SURGICAL HISTORY  Sickle cell anemia  History of cholecystectomy  H/O splenectomy                                            -----------------------------------------------------------  ALLERGIES:  No Known Allergies                                            ------------------------------------------------------------  MEDICATIONS:  STANDING MEDICATIONS  docusate sodium 100 milliGRAM(s) Oral three times a day  folic acid 1 milliGRAM(s) Oral daily  senna 2 Tablet(s) Oral at bedtime                                              ------------------------------------------------------------  VITAL SIGNS: Last 24 Hours  T(C): 36.4 (2019 04:33), Max: 36.5 (2019 20:42)  T(F): 97.6 (2019 04:33), Max: 97.7 (2019 20:42)  HR: 79 (2019 04:33) (63 - 79)  BP: 96/54 (2019 04:33) (96/54 - 118/66)  RR: 18 (2019 04:33) (18 - 18)  SpO2: 98% (2019 20:07) (98% - 98%)                                             --------------------------------------------------------------  LABS:                        8.2    9.34  )-----------( 372      ( 2019 08:12 )             23.0         137  |  105  |  9<L>  ----------------------------<  101<H>  4.6   |  20  |  0.5<L>    Ca    8.9      2019 08:12    TPro  6.2  /  Alb  3.8  /  TBili  10.6<H>  /  DBili  5.1<H>  /  AST  80<H>  /  ALT  83<H>  /  AlkPhos  72      Reticulocyte Count in AM (19 @ 08:12)    RBC Count: 2.26 M/uL    Reticulocyte Percent: 23.4 %    Absolute Reticulocytes: 529.3 K/uL    Vitamin B12, Serum (19 @ 11:24)    Vitamin B12, Serum: 765 pg/mL    Urinalysis Basic - ( 2019 19:14 )    Color: Orange / Appearance: Cloudy / S.015 / pH: x  Gluc: x / Ketone: Negative  / Bili: Moderate / Urobili: >=8.0 mg/dL   Blood: x / Protein: Negative mg/dL / Nitrite: Negative   Leuk Esterase: Negative / RBC: 3-5 /HPF / WBC 1-2 /HPF   Sq Epi: x / Non Sq Epi: Moderate /HPF / Bacteria: Few /HPF      Culture - Blood (collected 2019 00:25)  Source: .Blood None  Preliminary Report (2019 06:00):    No growth to date.                                                -------------------------------------------------------------  RADIOLOGY:  < from: US Abdomen Limited (19 @ 22:40) >    IMPRESSION:    Post cholecystectomy. Otherwise, unremarkable right upper quadrant   sonogram.                                            --------------------------------------------------------------    PHYSICAL EXAM:    GENERAL: NAD, well-developed, AAOx3  HEENT:  Atraumatic, Normocephalic. PERRLA, icteric sclera, No JVD  PULMONARY: Clear to auscultation bilaterally; No wheeze  CARDIOVASCULAR: Regular rate and rhythm; No murmurs, rubs, or gallops; tenderness+ right breast  GASTROINTESTINAL: Soft, Nontender, Nondistended; Bowel sounds present  MUSCULOSKELETAL:  2+ Peripheral Pulses, No clubbing, cyanosis, or edema  NEUROLOGY: non-focal  SKIN: No rashes or lesions                                           --------------------------------------------------------------    ASSESSMENT & PLAN    Patient is a 27 yo F with PMHx of Sickle Cell Disease presenting with complaints of painless jaundice.     #Jaundice likely secondary to Acute Sickle Crisis without Vaso-occlusive symptoms  -TPro  6.2  /  Alb  3.8  /  TBili  10.6<H>  /  DBili  5.1<H>  /  AST  80<H>  /  ALT  83<H>  /  AlkPhos  72  -  -Reticulocyte Percent: 23.4 %; Absolute Reticulocytes: 529.3 K/uL  -US of liver - unremarkable  -Iv fluid hydration  -Hematology eval noted and appreciated- jaundice likely drug induced from patch for birth control. Consider gyn evaluation. IV hydration. Folic acid levels and start folate. Will need clearance from hematology for d/c  -GI evaluation for ruling out congenital causes of jaundice    #Rt breast tenderness  -Possible Outpt follow up in breast clinic for US breast    #DVT prophylaxis Ambulating well  #GI prophylaxis Not indicated  #Diet Regular  #Activity AAT  #Code status FULL  #Disposition home -pending GI recs and hematology clearance. Anticipated for tomorrow                                                                             --------------------------------------------------------

## 2019-04-18 NOTE — DISCHARGE NOTE PROVIDER - HOSPITAL COURSE
Jaundice likely secondary to Acute Sickle Crisis without Vaso-occlusive symptoms    -TPro  6.2  /  Alb  3.8  /  TBili  10.6<H>  /  DBili  5.1<H>  /  AST  80<H>  /  ALT  83<H>  /  AlkPhos  72  04-17    -Reticulocyte Percent: 23.4 %; Absolute Reticulocytes: 529.3 K/uL    -US of liver - unremarkable    -Iv fluid hydration    -Hematology eval noted and appreciated- jaundice likely drug induced from patch for birth control. Consider gyn evaluation. IV hydration. Folic acid levels and start folate.     -GI outpt workup to rule out intrinsic liver causes for jaundice        #Rt breast tenderness    -Possible Outpt follow up in breast clinic for US breast 29 yo F with PMHx of Sickle Cell Disease presenting with complaints of painless jaundice.         #Jaundice likely secondary to Acute Sickle Crisis without Vaso-occlusive symptoms    -TPro  6.3  /  Alb  3.8  /  TBili  11.1<H> <---12.2 /  DBili  5.3<H> <--6.1 /  AST  66<H> <--78 /  ALT  72<H> <---84 /  AlkPhos  78<--82    -Reticulocyte Percent: 22.9<--23.4 %; Absolute Reticulocytes: 508.4<---529.3 K/uL    -US of liver - unremarkable; MRCP done: No evidence for biliary ductal dilation nor choledocholithiasis.  Diffusely signal abnormality involving the liver and bone marrow, suggestive of iron deposition (reticuloendothelial deposition pattern).    -Iv fluid hydration    -Hematology eval noted and appreciated- jaundice likely drug induced from patch for birth control. Consider gyn evaluation. IV hydration. Folic acid levels and start folate. Will need hematology follow up    -As per GI, workup sent to rule out other causes of jaundice -Hepatitis B Core IgG and IgM , Hept C RNA ,Smooth Muscle Antibody, Transferrin Saturation, SPEP, Anti LK M1 antibody, AMA, HOLLI, Ceruloplasmin, Ferritin. Will follow up results. Pt to follow up in liver clinic outpt    -Ophtha consulted for KF rings for ban ds- no KF rings seen        #Rt breast tenderness    -Possible Outpt follow up in breast clinic for US breast

## 2019-04-18 NOTE — DISCHARGE NOTE PROVIDER - CARE PROVIDERS DIRECT ADDRESSES
[email or web address],[email or web address]

## 2019-04-18 NOTE — PROGRESS NOTE ADULT - ASSESSMENT
This is 29 yo female with PMH of Sickle cell disease was admitted for painless jaundice after recent trip to [facility]. She has no abdominal pain, mild itching. No recent medication use, only birth control patch.    A/P:   Jaundice: painless , no abdominal pain  TB 10, DB: 5, abdomen US: post cholecystectomy, unremarkable.   Less likely from hemolysis or sickling as most of elevation is Direct bilirubin. Also no signs of biliary obstruction on US.  Labs from 2015 showed TB 7.2, suggest chronic elevation. May we need to rule out congenital disease. Get GI consult as well.   Viral hepatitis panel negative. Check TSH.   Hematology follow up.   Birth control patch already removed.     Sickle cell disease;   Hb at baseline, no evidence of acute crisis.   Last hospitalization in April 2018 for acute chest syndrome, she received exchange transfusion.   hematology follow up.     #Progress Note Handoff:  Pending (specify):  Consults: GI, hematology follow up, improving of TB.   Family discussion: with the patient.  Disposition: Home possible in 24 hrs.

## 2019-04-18 NOTE — DISCHARGE NOTE PROVIDER - NSDCFUADDINST_GEN_ALL_CORE_FT
Please follow up with Hematology for sickle cell ds follow up within 1 week  Please follow up with Ob/Gyn at [facility] for birth control options   Please follow up with Gastroenterology at [facility] for workup for jaundice to rule out liver related causes  Please avoid dehydration, low oxygen, stressful conditions, high altitude  Please follow up with PMD for TMJ management  Please call [phone]/ come to the ED if any symptoms of pain, increased jaundice/yellowing of skin/eyes Please follow up with Hematology for sickle cell ds follow up within 1 week  Please follow up with Ob/Gyn at [facility] for birth control options   Please follow up with Gastroenterology/liver clinic for workup for jaundice to rule out liver related causes  Please avoid dehydration, low oxygen, stressful conditions, high altitude  Please follow up with PMD for TMJ management  Please call 911/ come to the ED if any symptoms of pain, increased jaundice/yellowing of skin/eyes

## 2019-04-18 NOTE — PROGRESS NOTE ADULT - SUBJECTIVE AND OBJECTIVE BOX
ALEIDA LUCIA  28y  Female      Patient is a 28y old  Female who presents with a chief complaint of Sepsis (2019 09:27)      INTERVAL HPI/OVERNIGHT EVENTS:  She feels ok, she has no pain, urine is dark, stool is normal.     Vital Signs Last 24 Hrs  T(C): 36.4 (2019 04:33), Max: 36.5 (2019 20:42)  T(F): 97.6 (2019 04:33), Max: 97.7 (2019 20:42)  HR: 79 (2019 04:33) (63 - 79)  BP: 96/54 (2019 04:33) (96/54 - 118/66)  BP(mean): --  RR: 18 (2019 04:33) (18 - 18)  SpO2: 98% (2019 07:32) (98% - 98%)            Consultant(s) Notes Reviewed:  [x ] YES  [ ] NO          MEDICATIONS  (STANDING):  docusate sodium 100 milliGRAM(s) Oral three times a day  folic acid 1 milliGRAM(s) Oral daily  senna 2 Tablet(s) Oral at bedtime  sodium chloride 0.9%. 1000 milliLiter(s) (75 mL/Hr) IV Continuous <Continuous>    MEDICATIONS  (PRN):      LABS                          8.2    9.34  )-----------( 372      ( 2019 08:12 )             23.0         140  |  103  |  9<L>  ----------------------------<  106<H>  4.6   |  22  |  0.7    Ca    9.5      2019 09:10    TPro  6.8  /  Alb  4.3  /  TBili  12.2<H>  /  DBili  6.1<H>  /  AST  78<H>  /  ALT  84<H>  /  AlkPhos  82        Urinalysis Basic - ( 2019 19:14 )    Color: Orange / Appearance: Cloudy / S.015 / pH: x  Gluc: x / Ketone: Negative  / Bili: Moderate / Urobili: >=8.0 mg/dL   Blood: x / Protein: Negative mg/dL / Nitrite: Negative   Leuk Esterase: Negative / RBC: 3-5 /HPF / WBC 1-2 /HPF   Sq Epi: x / Non Sq Epi: Moderate /HPF / Bacteria: Few /HPF        Lactate Trend   @ 18:18 Lactate:0.8         CAPILLARY BLOOD GLUCOSE          Culture - Blood (collected 19 @ 00:25)  Source: .Blood None  Preliminary Report (19 @ 06:00):    No growth to date.        RADIOLOGY & ADDITIONAL TESTS:    Imaging Personally Reviewed:  [ ] YES  [ ] NO    HEALTH ISSUES - PROBLEM Dx:        PHYSICAL EXAM:  GENERAL: NAD, well-developed  HEAD:  Atraumatic, Normocephalic  EYES: EOMI, PERRLA, icteric sclera.   NECK: Supple, No JVD  CHEST/LUNG: Clear to auscultation bilaterally; No wheeze  HEART: Regular rate and rhythm; No murmurs, rubs, or gallops  ABDOMEN: Soft, Nontender, Nondistended; Bowel sounds present  EXTREMITIES:  2+ Peripheral Pulses, No clubbing, cyanosis, or edema  PSYCH: AAOx3  NEUROLOGY: non-focal  SKIN: No rashes or lesions

## 2019-04-18 NOTE — CONSULT NOTE ADULT - ASSESSMENT
lab work reviewed   retic count high appropriate   sonogram abdomen ok   hb her usual is 8 gm   only new med is patch for b control  could be drug induced from the patch   as pt does not appear septic and no risis symptoms  suggest hydartion and consult gyn for possibe side effect of patch   follow labs send b12 and folate level   folic acid 1 mg daily as pt says not taking it   will follow
Patient is a 29 yo F with PMHx of Sickle Cell Disease presenting with complaints of painless jaundice. Per patient, she [place]. She was there for about 6 days. She says that when she first arrived, she began to notice she had some mild yellowing of her eyes which she has had before. She did not think much of it but notes she was progressively getting more fatigued throughout her trip. She then came back and began to note that her eyes became even more yellow in color as well as her skin. She also notes very dark colored urine but otherwise denies any urinary complaints. She also began to have pruritis. Therefore, she came in for further evaluation. On arrival to the ED, she was found on blood work to have a Hb of 8.7, retic count of 24.5, Total Bili of 10 with both Indirect and Direct Bili of approx 5. She denies any abdominal pain, fevers, chills, chest pain, cough, wheezing, shortness of breath or any other complaints except for some jaw pain which she contributes to her TMJ. She denies any sort of tick or mosquito bite that she is aware of while traveling  Patient only new med is OCP patch that was removed     # Transaminitis with predominant hyperbil rule out benign hyperbil secondary to sickle cell disease vs DILI less likely choledocholithiasis ( No pain, normal alk phos)   No encephelopathy   No fever , no abdominal pain and no evidence of sickle cell crisis   Check Hepatitis B Core IgG and IgM , Hept C RNA ,Smooth Muscle Antibody, Transferrin Saturation, SPEP, Anti LK M1 antibody, AMA, HOLLI, Ceruloplasmin, Ferritin  MRCP to assess for bile duct stones or strictures   Check INR  Daily liver enzymes and INR   Hematology follow up

## 2019-04-18 NOTE — DISCHARGE NOTE PROVIDER - NSDCCPCAREPLAN_GEN_ALL_CORE_FT
PRINCIPAL DISCHARGE DIAGNOSIS  Diagnosis: Jaundice  Assessment and Plan of Treatment: TPro  6.2  /  Alb  3.8  /  TBili  10.6<H>  /  DBili  5.1<H>  /  AST  80<H>  /  ALT  83<H>  /  AlkPhos  72  04-17  -Reticulocyte Percent: 23.4 %; Absolute Reticulocytes: 529.3 K/uL  -US of liver - unremarkable  -Iv fluid hydration inpatient; encourage PO fluids  -Hematology eval noted and appreciated- jaundice likely drug induced from patch for birth control. Consider gyn evaluation -outpt evaluation. IV hydration. Folic acid levels and start folate.   -GI outpt workup to rule out intrinsic liver causes for jaundice      SECONDARY DISCHARGE DIAGNOSES  Diagnosis: Breast tenderness  Assessment and Plan of Treatment: -right side  -Outpt follow up in breast clinic    Diagnosis: Hb-SS disease with crisis  Assessment and Plan of Treatment: -Stable at this time  -Outpt hematology follow up PRINCIPAL DISCHARGE DIAGNOSIS  Diagnosis: Jaundice  Assessment and Plan of Treatment: -T bili high--range 9-12; ast alt high normal; alk p high normal  -US of liver - unremarkable  -Iv fluid hydration inpatient; encourage PO fluids  -Hematology eval noted and appreciated- jaundice likely drug induced from patch for birth control. Consider gyn evaluation -outpt evaluation.  -GI outpt workup to rule out intrinsic liver causes for jaundice- f/u outpt in liver clinic      SECONDARY DISCHARGE DIAGNOSES  Diagnosis: Breast tenderness  Assessment and Plan of Treatment: -right side  -Outpt follow up in breast clinic    Diagnosis: Hb-SS disease with crisis  Assessment and Plan of Treatment: -Stable at this time  -Outpt hematology follow up

## 2019-04-18 NOTE — DISCHARGE NOTE PROVIDER - PROVIDER TOKENS
PROVIDER:[TOKEN:[[id number]]],FREE:[LAST:[[facility]FIRST:[Women's health],PHONE:[phone]],FAX:[(   )    -],ADDRESS:[[address] [place] [address]]],FREE:[LAST:[Gastroenterology],PHONE:[phone]],FAX:[(   )    -],ADDRESS:[[address] [place] [address]]],PROVIDER:[TOKEN:[[id number]]] PROVIDER:[TOKEN:[[id number]]],PROVIDER:[TOKEN:[[id number]]],FREE:[LAST:[[facility]],FIRST:[Women's health],PHONE:[phone]],FAX:[(   )    -],ADDRESS:[[address] [place] [address]],PROVIDER:[TOKEN:[[id number]]]

## 2019-04-18 NOTE — DISCHARGE NOTE PROVIDER - CARE PROVIDER_API CALL
Madhuri Cisneros)  Hematology; Internal Medicine; Medical Oncology  [address] [place] [address]  Phone: [phone]  Fax: [phone]  Follow Up Time:     [facility], Women's health  [address] [place] [address]  Phone: [phone]  Fax: (   )    -  Follow Up Time:     Gastroenterology,   [address] [place] [address]  Phone: [phone]  Fax: (   )    -  Follow Up Time:     Krystyna David)  Internal Medicine  [address] [place] [address]  Phone: [phone]  Fax: [phone]  Follow Up Time: Madhuri Cisneros)  Hematology; Internal Medicine; Medical Oncology  [address] [place] [address]  Phone: [phone]  Fax: [phone]  Follow Up Time:     Krystyna David)  Internal Medicine  [address] [place] [address]  Phone: [phone]  Fax: [phone]  Follow Up Time:     [place] [facility]  [address] [place] [address]  Phone: [phone]  Fax: (   )    -  Follow Up Time:     Faizan Carter)  Gastroenterology; Internal Medicine  [address] [place] [address]  Phone: [phone]  Fax: [phone]  Follow Up Time:

## 2019-04-18 NOTE — CONSULT NOTE ADULT - SUBJECTIVE AND OBJECTIVE BOX
history taken   pt examined   admitted for jaundice   noticed yellow wyes   no pain   no fever   no vomiting   abdomen soft ,pt eating normal   s/p cholecystectomy few years ago
GI HPI  Patient is a 27 yo F with PMHx of Sickle Cell Disease presenting with complaints of painless jaundice. Per patient, she recently went on a trip to [facility] [place]. She was there for about 6 days. She says that when she first arrived, she began to notice she had some mild yellowing of her eyes which she has had before. She did not think much of it but notes she was progressively getting more fatigued throughout her trip. She then came back and began to note that her eyes became even more yellow in color as well as her skin. She also notes very dark colored urine but otherwise denies any urinary complaints. She also began to have pruritis. Therefore, she came in for further evaluation. On arrival to the ED, she was found on blood work to have a Hb of 8.7, retic count of 24.5, Total Bili of 10 with both Indirect and Direct Bili of approx 5. She denies any abdominal pain, fevers, chills, chest pain, cough, wheezing, shortness of breath or any other complaints except for some jaw pain which she contributes to her TMJ. She denies any sort of tick or mosquito bite that she is aware of while traveling  Patient only new med is OCP patch that was removed         PAST MEDICAL & SURGICAL HISTORY  Sickle cell anemia  History of cholecystectomy  H/O splenectomy          FAMILY HISTORY:  FAMILY HISTORY:  Family history of sickle cell trait in mother  Family history of sickle cell trait in father      ALLERGIES:  No Known Allergies      MEDICATIONS:  MEDICATIONS  (STANDING):  docusate sodium 100 milliGRAM(s) Oral three times a day  folic acid 1 milliGRAM(s) Oral daily  senna 2 Tablet(s) Oral at bedtime  sodium chloride 0.9%. 1000 milliLiter(s) (75 mL/Hr) IV Continuous <Continuous>    MEDICATIONS  (PRN):  methocarbamol 500 milliGRAM(s) Oral every 8 hours PRN Muscle Spasm      HOME MEDICATIONS:  Home Medications:      ROS:     REVIEW OF SYSTEMS  General:  No fevers  Eyes:  No reported pain   ENT:  No sore throat   NECK: No stiffness   CV:  No chest pain   Resp:  No shortness of breath  GI:  See HPI  :  No dysuria  Muscle:  No weakness  Neuro:  No tingling  Endocrine:  No polyuria  Heme:  No ecchymosis          VITALS:   T(F): 98.2 (04-18 @ 13:30), Max: 98.4 (04-16 @ 23:28)  HR: 78 (04-18 @ 13:30) (63 - 79)  BP: 107/57 (04-18 @ 13:30) (96/54 - 137/65)  BP(mean): --  RR: 18 (04-18 @ 13:30) (18 - 20)  SpO2: 98% (04-18 @ 07:32) (94% - 98%)    I&O's Summary      PHYSICAL EXAM:  GENERAL:  Appears in no distress  HEENT:  Conjunctivae  icteric  CHEST:  Full & symmetric excursion  HEART:  N S1, S2  ABDOMEN:  Soft, non-tender, non-distended, no masses   EXTREMITIES  no  edema  SKIN:  No rash  NEURO:  Alert        LABS:                        8.9    9.99  )-----------( 416      ( 18 Apr 2019 09:10 )             24.6       LIVER FUNCTIONS - ( 18 Apr 2019 09:10 )  Alb: 4.3 g/dL / Pro: 6.8 g/dL / ALK PHOS: 82 U/L / ALT: 84 U/L / AST: 78 U/L / GGT: x           04-18    140  |  103  |  9<L>  ----------------------------<  106<H>  4.6   |  22  |  0.7    Ca    9.5      18 Apr 2019 09:10        RADIOLOGY:  < from: US Abdomen Limited (04.16.19 @ 22:40) >    EXAM:  US ABDOMEN LIMITED            PROCEDURE DATE:  04/16/2019            INTERPRETATION:  CLINICAL HISTORY: Jaundice. Pain.    COMPARISON: None.    PROCEDURE: Ultrasound of the right upper quadrant was performed.    FINDINGS:    LIVER:  Normal in contour and echogenicity measuring 18.7 cm in length.   No focal mass.    GALLBLADDER/BILIARY TREE:  Postcholecystectomy. No intrahepatic biliary   ductal dilatation. The common bile duct measures 4 mm, which is normal.     PANCREAS:  Visualized head and body are unremarkable. Remainder obscured   by overlying bowel gas.    KIDNEY:  Right kidney measures 11.4 cm in length. No hydronephrosis,   calculi or solid mass.    AORTA/IVC:  Visualized proximal portions unremarkable.    ASCITES:  None.      IMPRESSION:    Post cholecystectomy. Otherwise, unremarkable right upper quadrant   sonogram.              YASMANI FREDERICK M.D., RESIDENT RADIOLOGIST  This document has been electronically signed.  MIMI HAMM M.D., ATTENDING RADIOLOGIST  This document has been electronically signed. Apr 16 2019 11:34PM        < end of copied text >

## 2019-04-19 VITALS
HEART RATE: 82 BPM | DIASTOLIC BLOOD PRESSURE: 69 MMHG | SYSTOLIC BLOOD PRESSURE: 114 MMHG | RESPIRATION RATE: 18 BRPM | TEMPERATURE: 99 F

## 2019-04-19 LAB
ALBUMIN SERPL ELPH-MCNC: 3.8 G/DL — SIGNIFICANT CHANGE UP (ref 3.5–5.2)
ALP SERPL-CCNC: 78 U/L — SIGNIFICANT CHANGE UP (ref 30–115)
ALT FLD-CCNC: 72 U/L — HIGH (ref 0–41)
ANION GAP SERPL CALC-SCNC: 12 MMOL/L — SIGNIFICANT CHANGE UP (ref 7–14)
AST SERPL-CCNC: 66 U/L — HIGH (ref 0–41)
BASOPHILS # BLD AUTO: 0.11 K/UL — SIGNIFICANT CHANGE UP (ref 0–0.2)
BASOPHILS NFR BLD AUTO: 1 % — SIGNIFICANT CHANGE UP (ref 0–1)
BILIRUB DIRECT SERPL-MCNC: 5.3 MG/DL — HIGH (ref 0–0.2)
BILIRUB INDIRECT FLD-MCNC: 5.8 MG/DL — HIGH (ref 0.2–1.2)
BILIRUB SERPL-MCNC: 11.1 MG/DL — HIGH (ref 0.2–1.2)
BUN SERPL-MCNC: 11 MG/DL — SIGNIFICANT CHANGE UP (ref 10–20)
CALCIUM SERPL-MCNC: 9.2 MG/DL — SIGNIFICANT CHANGE UP (ref 8.5–10.1)
CHLORIDE SERPL-SCNC: 104 MMOL/L — SIGNIFICANT CHANGE UP (ref 98–110)
CO2 SERPL-SCNC: 23 MMOL/L — SIGNIFICANT CHANGE UP (ref 17–32)
CREAT SERPL-MCNC: 0.6 MG/DL — LOW (ref 0.7–1.5)
EOSINOPHIL # BLD AUTO: 0.34 K/UL — SIGNIFICANT CHANGE UP (ref 0–0.7)
EOSINOPHIL NFR BLD AUTO: 3 % — SIGNIFICANT CHANGE UP (ref 0–8)
FOLATE SERPL-MCNC: >20 NG/ML — SIGNIFICANT CHANGE UP
G6PD RBC-CCNC: 26 U/G HGB — HIGH (ref 7–20.5)
GLUCOSE SERPL-MCNC: 83 MG/DL — SIGNIFICANT CHANGE UP (ref 70–99)
HCT VFR BLD CALC: 22.5 % — LOW (ref 37–47)
HGB BLD-MCNC: 8.1 G/DL — LOW (ref 12–16)
IMM GRANULOCYTES NFR BLD AUTO: 1.1 % — HIGH (ref 0.1–0.3)
LYMPHOCYTES # BLD AUTO: 26.3 % — SIGNIFICANT CHANGE UP (ref 20.5–51.1)
LYMPHOCYTES # BLD AUTO: 3.01 K/UL — SIGNIFICANT CHANGE UP (ref 1.2–3.4)
MCHC RBC-ENTMCNC: 36 G/DL — SIGNIFICANT CHANGE UP (ref 32–37)
MCHC RBC-ENTMCNC: 36.5 PG — HIGH (ref 27–31)
MCV RBC AUTO: 101.4 FL — HIGH (ref 81–99)
MONOCYTES # BLD AUTO: 1.08 K/UL — HIGH (ref 0.1–0.6)
MONOCYTES NFR BLD AUTO: 9.4 % — HIGH (ref 1.7–9.3)
NEUTROPHILS # BLD AUTO: 6.77 K/UL — HIGH (ref 1.4–6.5)
NEUTROPHILS NFR BLD AUTO: 59.2 % — SIGNIFICANT CHANGE UP (ref 42.2–75.2)
NRBC # BLD: 5 /100 WBCS — HIGH (ref 0–0)
PLATELET # BLD AUTO: 408 K/UL — HIGH (ref 130–400)
POTASSIUM SERPL-MCNC: 4.6 MMOL/L — SIGNIFICANT CHANGE UP (ref 3.5–5)
POTASSIUM SERPL-SCNC: 4.6 MMOL/L — SIGNIFICANT CHANGE UP (ref 3.5–5)
PROT SERPL-MCNC: 6.3 G/DL — SIGNIFICANT CHANGE UP (ref 6–8)
RBC # BLD: 2.22 M/UL — LOW (ref 4.2–5.4)
RBC # BLD: 2.22 M/UL — LOW (ref 4.2–5.4)
RBC # FLD: 18 % — HIGH (ref 11.5–14.5)
RETICS #: 508.4 K/UL — HIGH (ref 25–125)
RETICS/RBC NFR: 22.9 % — HIGH (ref 0.5–1.5)
SODIUM SERPL-SCNC: 139 MMOL/L — SIGNIFICANT CHANGE UP (ref 135–146)
TRANSFERRIN SERPL-MCNC: 201 MG/DL — SIGNIFICANT CHANGE UP (ref 200–360)
WBC # BLD: 11.44 K/UL — HIGH (ref 4.8–10.8)
WBC # FLD AUTO: 11.44 K/UL — HIGH (ref 4.8–10.8)

## 2019-04-19 RX ADMIN — Medication 100 MILLIGRAM(S): at 13:46

## 2019-04-19 RX ADMIN — Medication 1 MILLIGRAM(S): at 12:39

## 2019-04-19 RX ADMIN — METHOCARBAMOL 500 MILLIGRAM(S): 500 TABLET, FILM COATED ORAL at 13:47

## 2019-04-19 NOTE — DISCHARGE NOTE NURSING/CASE MANAGEMENT/SOCIAL WORK - NSDCDPATPORTLINK_GEN_ALL_CORE
You can access the [facility] Patient Portal, offered by [facility] [place], by registering with the following website: [email or web address]

## 2019-04-19 NOTE — PROGRESS NOTE ADULT - ASSESSMENT
Patient is a 29 yo F with PMHx of Sickle Cell Disease presenting with complaints of painless jaundice. Per patient, she recently went on a trip to [facility]. She was there for about 6 days. She says that when she first arrived, she began to notice she had some mild yellowing of her eyes which she has had before. She did not think much of it but notes she was progressively getting more fatigued throughout her trip. She then came back and began to note that her eyes became even more yellow in color as well as her skin. She also notes very dark colored urine but otherwise denies any urinary complaints. She also began to have pruritis. Therefore, she came in for further evaluation. On arrival to the ED, she was found on blood work to have a Hb of 8.7, retic count of 24.5, Total Bili of 10 with both Indirect and Direct Bili of approx 5. She denies any abdominal pain, fevers, chills, chest pain, cough, wheezing, shortness of breath or any other complaints except for some jaw pain which she contributes to her TMJ. She denies any sort of tick or mosquito bite that she is aware of while traveling  Patient only new med is OCP patch that was removed     # Transaminitis with predominant hyperbil rule out benign hyperbil secondary to sickle cell disease vs DILI less likely choledocholithiasis ( No pain, normal alk phos)   No encephelopathy   No fever , no abdominal pain and no evidence of sickle cell crisis   MRCP performed but results pending   Check Hepatitis B Core IgG and IgM , Hept C RNA ,Smooth Muscle Antibody, Transferrin Saturation, SPEP, Anti LK M1 antibody, AMA, HOLLI, Ceruloplasmin, Ferritin  Check INR  Daily liver enzymes and INR   Hematology follow up Patient is a 29 yo F with PMHx of Sickle Cell Disease presenting with complaints of painless jaundice. Per patient, she recently went on a trip to [facility]. She was there for about 6 days. She says that when she first arrived, she began to notice she had some mild yellowing of her eyes which she has had before. She did not think much of it but notes she was progressively getting more fatigued throughout her trip. She then came back and began to note that her eyes became even more yellow in color as well as her skin. She also notes very dark colored urine but otherwise denies any urinary complaints. She also began to have pruritis. Therefore, she came in for further evaluation. On arrival to the ED, she was found on blood work to have a Hb of 8.7, retic count of 24.5, Total Bili of 10 with both Indirect and Direct Bili of approx 5. She denies any abdominal pain, fevers, chills, chest pain, cough, wheezing, shortness of breath or any other complaints except for some jaw pain which she contributes to her TMJ. She denies any sort of tick or mosquito bite that she is aware of while traveling  Patient only new med is OCP patch that was removed     # Transaminitis with predominant hyperbil rule out benign hyperbil secondary to sickle cell disease vs DILI less likely choledocholithiasis ( No pain, normal alk phos)   No encephelopathy   No fever , no abdominal pain and no evidence of sickle cell crisis   MRCP performed no cbd dilation or stones and no stricture   Check Hepatitis B Core IgG and IgM , Hept C RNA ,Smooth Muscle Antibody, Transferrin Saturation, SPEP, Anti LK M1 antibody, AMA, HOLLI, Ceruloplasmin, Ferritin  Hematology follow up   patient can follow with liver clinic

## 2019-04-19 NOTE — PROGRESS NOTE ADULT - ASSESSMENT
most likely from new med ie bc patch  advised to stop it   can be discharged   will follow as out pt

## 2019-04-19 NOTE — PROGRESS NOTE ADULT - SUBJECTIVE AND OBJECTIVE BOX
ALEIDA LUCIA 28y Female  MRN#: [id number]   CODE STATUS:________      SUBJECTIVE  HPI    Overnight events     Subjective complaints     Present Today:   - [place]  - Drains   - [place] :                                             ----------------------------------------------------------  OBJECTIVE  PAST MEDICAL & SURGICAL HISTORY  Sickle cell anemia  History of cholecystectomy  H/O splenectomy                                            -----------------------------------------------------------  ALLERGIES:  No Known Allergies                                            ------------------------------------------------------------  MEDICATIONS:  STANDING MEDICATIONS  docusate sodium 100 milliGRAM(s) Oral three times a day  folic acid 1 milliGRAM(s) Oral daily  senna 2 Tablet(s) Oral at bedtime  sodium chloride 0.9%. 1000 milliLiter(s) IV Continuous <Continuous>    PRN MEDICATIONS  methocarbamol 500 milliGRAM(s) Oral every 8 hours PRN                                            ------------------------------------------------------------  VITAL SIGNS: Last 24 Hours  T(C): 36.4 (19 Apr 2019 05:00), Max: 36.8 (18 Apr 2019 13:30)  T(F): 97.5 (19 Apr 2019 05:00), Max: 98.2 (18 Apr 2019 13:30)  HR: 78 (19 Apr 2019 05:00) (75 - 78)  BP: 119/55 (19 Apr 2019 05:00) (105/55 - 119/55)  BP(mean): --  RR: 19 (19 Apr 2019 05:00) (18 - 19)  SpO2: --                                             --------------------------------------------------------------  LABS:                        8.1    11.44 )-----------( 408      ( 19 Apr 2019 07:50 )             22.5     04-19    139  |  104  |  11  ----------------------------<  83  4.6   |  23  |  0.6<L>    Ca    9.2      19 Apr 2019 07:50    TPro  6.3  /  Alb  3.8  /  TBili  11.1<H>  /  DBili  5.3<H>  /  AST  66<H>  /  ALT  72<H>  /  AlkPhos  78  04-19      Culture - Blood (collected 17 Apr 2019 08:12)  Source: .Blood None  Preliminary Report (18 Apr 2019 18:01):    No growth to date.    Reticulocyte Count in AM (04.19.19 @ 07:50)    RBC Count: 2.22 M/uL    Reticulocyte Percent: 22.9 %    Absolute Reticulocytes: 508.4 K/uL      Transferrin, Serum (04.19.19 @ 01:04)    Transferrin, Serum: 201 mg/dL                                            -------------------------------------------------------------  RADIOLOGY:  < from: MR Abdomen No Cont (04.18.19 @ 22:47) >  IMPRESSION:  1.  Post cholecystectomy with no evidence for biliary ductal dilation nor   choledocholithiasis.    2.  Diffusely signal abnormality involving the liver and bone marrow,   suggestive of iron deposition (reticuloendothelial deposition pattern).    < from: US Abdomen Limited (04.16.19 @ 22:40) >    IMPRESSION:    Post cholecystectomy. Otherwise, unremarkable right upper quadrant   sonogram.                                            --------------------------------------------------------------    PHYSICAL EXAM:    GENERAL: NAD, well-developed, AAOx3  HEENT:  Atraumatic, Normocephalic. PERRLA, icteric sclera, No JVD  PULMONARY: Clear to auscultation bilaterally; No wheeze  CARDIOVASCULAR: Regular rate and rhythm; No murmurs, rubs, or gallops; tenderness+ right breast  GASTROINTESTINAL: Soft, Nontender, Nondistended; Bowel sounds present  MUSCULOSKELETAL:  2+ Peripheral Pulses, No clubbing, cyanosis, or edema  NEUROLOGY: non-focal  SKIN: No rashes or lesions                                           --------------------------------------------------------------    ASSESSMENT & PLAN    Patient is a 29 yo F with PMHx of Sickle Cell Disease presenting with complaints of painless jaundice.     #Jaundice likely secondary to Acute Sickle Crisis without Vaso-occlusive symptoms  -TPro  6.3  /  Alb  3.8  /  TBili  11.1<H> <---12.2 /  DBili  5.3<H> <--6.1 /  AST  66<H> <--78 /  ALT  72<H> <---84 /  AlkPhos  78<--82  -Reticulocyte Percent: 22.9<--23.4 %; Absolute Reticulocytes: 508.4<---529.3 K/uL  -US of liver - unremarkable; MRCP done: No evidence for biliary ductal dilation nor choledocholithiasis.  Diffusely signal abnormality involving the liver and bone marrow, suggestive of iron deposition (reticuloendothelial deposition pattern).  -Iv fluid hydration  -Hematology eval noted and appreciated- jaundice likely drug induced from patch for birth control. Consider gyn evaluation. IV hydration. Folic acid levels and start folate. Will need hematology follow up  -As per GI, workup sent to rule out other causes of jaundice -Hepatitis B Core IgG and IgM , Hept C RNA ,Smooth Muscle Antibody, Transferrin Saturation, SPEP, Anti LK M1 antibody, AMA, HOLLI, Ceruloplasmin, Ferritin. Will follow up results. Pt to follow up in liver clinic outpt    #Rt breast tenderness  -Possible Outpt follow up in breast clinic for US breast    #DVT prophylaxis Ambulating well  #GI prophylaxis Not indicated  #Diet Regular  #Activity AAT  #Code status FULL  #Disposition home ; possible d/c today ALEIDA LUCIA 28y Female  MRN#: [id number]   CODE STATUS: FULL      SUBJECTIVE  Overnight events- None     Subjective complaints- Still has yellow eyes and dark urine. No other complaints     OBJECTIVE  PAST MEDICAL & SURGICAL HISTORY  Sickle cell anemia  History of cholecystectomy  H/O splenectomy                                            -----------------------------------------------------------  ALLERGIES:  No Known Allergies                                            ------------------------------------------------------------  MEDICATIONS:  STANDING MEDICATIONS  docusate sodium 100 milliGRAM(s) Oral three times a day  folic acid 1 milliGRAM(s) Oral daily  senna 2 Tablet(s) Oral at bedtime  sodium chloride 0.9%. 1000 milliLiter(s) IV Continuous <Continuous>    PRN MEDICATIONS  methocarbamol 500 milliGRAM(s) Oral every 8 hours PRN                                            ------------------------------------------------------------  VITAL SIGNS: Last 24 Hours  T(C): 36.4 (19 Apr 2019 05:00), Max: 36.8 (18 Apr 2019 13:30)  T(F): 97.5 (19 Apr 2019 05:00), Max: 98.2 (18 Apr 2019 13:30)  HR: 78 (19 Apr 2019 05:00) (75 - 78)  BP: 119/55 (19 Apr 2019 05:00) (105/55 - 119/55)  BP(mean): --  RR: 19 (19 Apr 2019 05:00) (18 - 19)  SpO2: --                                             --------------------------------------------------------------  LABS:                        8.1    11.44 )-----------( 408      ( 19 Apr 2019 07:50 )             22.5     04-19    139  |  104  |  11  ----------------------------<  83  4.6   |  23  |  0.6<L>    Ca    9.2      19 Apr 2019 07:50    TPro  6.3  /  Alb  3.8  /  TBili  11.1<H>  /  DBili  5.3<H>  /  AST  66<H>  /  ALT  72<H>  /  AlkPhos  78  04-19      Culture - Blood (collected 17 Apr 2019 08:12)  Source: .Blood None  Preliminary Report (18 Apr 2019 18:01):    No growth to date.    Reticulocyte Count in AM (04.19.19 @ 07:50)    RBC Count: 2.22 M/uL    Reticulocyte Percent: 22.9 %    Absolute Reticulocytes: 508.4 K/uL      Transferrin, Serum (04.19.19 @ 01:04)    Transferrin, Serum: 201 mg/dL                                            -------------------------------------------------------------  RADIOLOGY:  < from: MR Abdomen No Cont (04.18.19 @ 22:47) >  IMPRESSION:  1.  Post cholecystectomy with no evidence for biliary ductal dilation nor   choledocholithiasis.    2.  Diffusely signal abnormality involving the liver and bone marrow,   suggestive of iron deposition (reticuloendothelial deposition pattern).    < from: US Abdomen Limited (04.16.19 @ 22:40) >    IMPRESSION:    Post cholecystectomy. Otherwise, unremarkable right upper quadrant   sonogram.                                            --------------------------------------------------------------    PHYSICAL EXAM:    GENERAL: NAD, well-developed, AAOx3  HEENT:  Atraumatic, Normocephalic. PERRLA, icteric sclera, No JVD  PULMONARY: Clear to auscultation bilaterally; No wheeze  CARDIOVASCULAR: Regular rate and rhythm; No murmurs, rubs, or gallops; tenderness+ right breast  GASTROINTESTINAL: Soft, Nontender, Nondistended; Bowel sounds present  MUSCULOSKELETAL:  2+ Peripheral Pulses, No clubbing, cyanosis, or edema  NEUROLOGY: non-focal  SKIN: No rashes or lesions                                           --------------------------------------------------------------    ASSESSMENT & PLAN    Patient is a 27 yo F with PMHx of Sickle Cell Disease presenting with complaints of painless jaundice.     #Jaundice likely secondary to Acute Sickle Crisis without Vaso-occlusive symptoms  -TPro  6.3  /  Alb  3.8  /  TBili  11.1<H> <---12.2 /  DBili  5.3<H> <--6.1 /  AST  66<H> <--78 /  ALT  72<H> <---84 /  AlkPhos  78<--82  -Reticulocyte Percent: 22.9<--23.4 %; Absolute Reticulocytes: 508.4<---529.3 K/uL  -US of liver - unremarkable; MRCP done: No evidence for biliary ductal dilation nor choledocholithiasis.  Diffusely signal abnormality involving the liver and bone marrow, suggestive of iron deposition (reticuloendothelial deposition pattern).  -Iv fluid hydration  -Hematology eval noted and appreciated- jaundice likely drug induced from patch for birth control. Consider gyn evaluation. IV hydration. Folic acid levels and start folate. Will need hematology follow up  -As per GI, workup sent to rule out other causes of jaundice -Hepatitis B Core IgG and IgM , Hept C RNA ,Smooth Muscle Antibody, Transferrin Saturation, SPEP, Anti LK M1 antibody, AMA, HOLLI, Ceruloplasmin, Ferritin. Will follow up results. Pt to follow up in liver clinic outpt    #Rt breast tenderness  -Possible Outpt follow up in breast clinic for US breast    #DVT prophylaxis Ambulating well  #GI prophylaxis Not indicated  #Diet Regular  #Activity AAT  #Code status FULL  #Disposition home ; possible d/c today ALEIDA LUCIA 28y Female  MRN#: [id number]   CODE STATUS: FULL      SUBJECTIVE  Overnight events- None     Subjective complaints- Still has yellow eyes and dark urine. No other complaints     OBJECTIVE  PAST MEDICAL & SURGICAL HISTORY  Sickle cell anemia  History of cholecystectomy  H/O splenectomy                                            -----------------------------------------------------------  ALLERGIES:  No Known Allergies                                            ------------------------------------------------------------  MEDICATIONS:  STANDING MEDICATIONS  docusate sodium 100 milliGRAM(s) Oral three times a day  folic acid 1 milliGRAM(s) Oral daily  senna 2 Tablet(s) Oral at bedtime  sodium chloride 0.9%. 1000 milliLiter(s) IV Continuous <Continuous>    PRN MEDICATIONS  methocarbamol 500 milliGRAM(s) Oral every 8 hours PRN                                            ------------------------------------------------------------  VITAL SIGNS: Last 24 Hours  T(C): 36.4 (19 Apr 2019 05:00), Max: 36.8 (18 Apr 2019 13:30)  T(F): 97.5 (19 Apr 2019 05:00), Max: 98.2 (18 Apr 2019 13:30)  HR: 78 (19 Apr 2019 05:00) (75 - 78)  BP: 119/55 (19 Apr 2019 05:00) (105/55 - 119/55)  RR: 19 (19 Apr 2019 05:00) (18 - 19)                                             --------------------------------------------------------------  LABS:                        8.1    11.44 )-----------( 408      ( 19 Apr 2019 07:50 )             22.5     04-19    139  |  104  |  11  ----------------------------<  83  4.6   |  23  |  0.6<L>    Ca    9.2      19 Apr 2019 07:50    TPro  6.3  /  Alb  3.8  /  TBili  11.1<H>  /  DBili  5.3<H>  /  AST  66<H>  /  ALT  72<H>  /  AlkPhos  78  04-19      Culture - Blood (collected 17 Apr 2019 08:12)  Source: .Blood None  Preliminary Report (18 Apr 2019 18:01):    No growth to date.    Reticulocyte Count in AM (04.19.19 @ 07:50)    RBC Count: 2.22 M/uL    Reticulocyte Percent: 22.9 %    Absolute Reticulocytes: 508.4 K/uL      Transferrin, Serum (04.19.19 @ 01:04)    Transferrin, Serum: 201 mg/dL                                            -------------------------------------------------------------  RADIOLOGY:  < from: MR Abdomen No Cont (04.18.19 @ 22:47) >  IMPRESSION:  1.  Post cholecystectomy with no evidence for biliary ductal dilation nor   choledocholithiasis.    2.  Diffusely signal abnormality involving the liver and bone marrow,   suggestive of iron deposition (reticuloendothelial deposition pattern).    < from: US Abdomen Limited (04.16.19 @ 22:40) >    IMPRESSION:    Post cholecystectomy. Otherwise, unremarkable right upper quadrant   sonogram.                                            --------------------------------------------------------------    PHYSICAL EXAM:    GENERAL: NAD, well-developed, AAOx3  HEENT:  Atraumatic, Normocephalic. PERRLA, icteric sclera, No JVD  PULMONARY: Clear to auscultation bilaterally; No wheeze  CARDIOVASCULAR: Regular rate and rhythm; No murmurs, rubs, or gallops; tenderness+ right breast  GASTROINTESTINAL: Soft, Nontender, Nondistended; Bowel sounds present  MUSCULOSKELETAL:  2+ Peripheral Pulses, No clubbing, cyanosis, or edema  NEUROLOGY: non-focal  SKIN: No rashes or lesions                                           --------------------------------------------------------------    ASSESSMENT & PLAN    Patient is a 29 yo F with PMHx of Sickle Cell Disease presenting with complaints of painless jaundice.     #Jaundice likely secondary to Acute Sickle Crisis without Vaso-occlusive symptoms  -TPro  6.3  /  Alb  3.8  /  TBili  11.1<H> <---12.2 /  DBili  5.3<H> <--6.1 /  AST  66<H> <--78 /  ALT  72<H> <---84 /  AlkPhos  78<--82  -Reticulocyte Percent: 22.9<--23.4 %; Absolute Reticulocytes: 508.4<---529.3 K/uL  -US of liver - unremarkable; MRCP done: No evidence for biliary ductal dilation nor choledocholithiasis.  Diffusely signal abnormality involving the liver and bone marrow, suggestive of iron deposition (reticuloendothelial deposition pattern).  -Iv fluid hydration  -Hematology eval noted and appreciated- jaundice likely drug induced from patch for birth control. Consider gyn evaluation. IV hydration. Folic acid levels and start folate. Will need hematology follow up  -As per GI, workup sent to rule out other causes of jaundice -Hepatitis B Core IgG and IgM , Hept C RNA ,Smooth Muscle Antibody, Transferrin Saturation, SPEP, Anti LK M1 antibody, AMA, HOLLI, Ceruloplasmin, Ferritin. Will follow up results. Pt to follow up in liver clinic outpt    #Rt breast tenderness  -Possible Outpt follow up in breast clinic for US breast    #DVT prophylaxis Ambulating well  #GI prophylaxis Not indicated  #Diet Regular  #Activity AAT  #Code status FULL  #Disposition home ; possible d/c today    Attending Attestation  Pt has been seen and examined. Case and Plan discussed at rounds and agree with above note as corrected.  Pt needs further w/up in the office (GI) to rule out all other causes of Hepatocellular and Biliary causes of clinical presentation.  Pt known? HgSS. This needs to be verified as well pls send Hg electrophoresis - ?trait vs disease? as no reports in the hospital system.    Dispo: f/u Hematology/GI/PMD or [facility] / Anticipate / f/u CBC

## 2019-04-19 NOTE — PROGRESS NOTE ADULT - SUBJECTIVE AND OBJECTIVE BOX
We are following the patient for Transaminitis      GI HPI Today:  No overnight events     PAST MEDICAL & SURGICAL HISTORY  Sickle cell anemia  History of cholecystectomy  H/O splenectomy      ALLERGIES:  No Known Allergies      MEDICATIONS:  MEDICATIONS  (STANDING):  docusate sodium 100 milliGRAM(s) Oral three times a day  folic acid 1 milliGRAM(s) Oral daily  senna 2 Tablet(s) Oral at bedtime  sodium chloride 0.9%. 1000 milliLiter(s) (75 mL/Hr) IV Continuous <Continuous>    MEDICATIONS  (PRN):  methocarbamol 500 milliGRAM(s) Oral every 8 hours PRN Muscle Spasm      REVIEW OF SYSTEMS  General:  No fevers  Eyes:  No reported pain   ENT:  No sore throat   NECK: No stiffness   CV:  No chest pain   Resp:  No shortness of breath  GI:  See HPI  :  No dysuria  Muscle:  No weakness  Neuro:  No tingling  Endocrine:  No polyuria  Heme:  No ecchymosis        VITALS:   T(F): 97.5 (04-19 @ 05:00), Max: 98.4 (04-16 @ 23:28)  HR: 78 (04-19 @ 05:00) (63 - 79)  BP: 119/55 (04-19 @ 05:00) (96/54 - 137/65)  BP(mean): --  RR: 19 (04-19 @ 05:00) (18 - 20)  SpO2: 98% (04-18 @ 07:32) (94% - 98%)        PHYSICAL EXAM:  GENERAL:  Appears in no distress  HEENT:  Conjunctivae icteric   CHEST:  Full & symmetric excursion  HEART:  NS1, S2,   ABDOMEN:  Soft, non-tender, non-distended, normoactive bowel sounds,  no masses   EXTEREMITIES:  no edema  SKIN:  No rash  NEURO:  Alert         Blood Work :                        8.9    9.99  )-----------( 416      ( 18 Apr 2019 09:10 )             24.6       04-18    140  |  103  |  9<L>  ----------------------------<  106<H>  4.6   |  22  |  0.7    Ca    9.5      18 Apr 2019 09:10      CBC -  ( 18 Apr 2019 09:10 )  Hemoglobin : 8.9    CBC -  ( 17 Apr 2019 08:12 )  Hemoglobin : 8.2    CBC -  ( 17 Apr 2019 00:25 )  Hemoglobin : 7.7    CBC -  ( 16 Apr 2019 18:18 )  Hemoglobin : 8.7      LIVER FUNCTIONS - ( 18 Apr 2019 09:10 )  Alb: 4.3 [3.5 - 5.2] / Pro: 6.8 [6.0 - 8.0] / ALK PHOS: 82 [30 - 115] / ALT: 84 [0 - 41] / AST: 78 [0 - 41] / GGT: x     LIVER FUNCTIONS - ( 17 Apr 2019 08:12 )  Alb: 3.8 [3.5 - 5.2] / Pro: 6.2 [6.0 - 8.0] / ALK PHOS: 72 [30 - 115] / ALT: 83 [0 - 41] / AST: 80 [0 - 41] / GGT: x     LIVER FUNCTIONS - ( 17 Apr 2019 00:25 )  Alb: 3.8 [3.5 - 5.2] / Pro: 5.8 [6.0 - 8.0] / ALK PHOS: 70 [30 - 115] / ALT: 79 [0 - 41] / AST: 70 [0 - 41] / GGT: x     LIVER FUNCTIONS - ( 16 Apr 2019 18:18 )  Alb: 4.6 [3.5 - 5.2] / Pro: 6.9 [6.0 - 8.0] / ALK PHOS: 81 [30 - 115] / ALT: 99 [0 - 41] / AST: 84 [0 - 41] / GGT: x

## 2019-04-20 LAB
CERULOPLASMIN SERPL-MCNC: 41 MG/DL — SIGNIFICANT CHANGE UP (ref 16–45)
FERRITIN SERPL-MCNC: 601 NG/ML — HIGH (ref 15–150)
HBV CORE AB SER-ACNC: SIGNIFICANT CHANGE UP
HBV CORE IGM SER-ACNC: SIGNIFICANT CHANGE UP
PROT SERPL-MCNC: 6.2 G/DL — SIGNIFICANT CHANGE UP (ref 6–8.3)
PROT SERPL-MCNC: 6.2 G/DL — SIGNIFICANT CHANGE UP (ref 6–8.3)

## 2019-04-21 LAB
% ALBUMIN: 60 % — SIGNIFICANT CHANGE UP
% ALPHA 1: 5 % — SIGNIFICANT CHANGE UP
% ALPHA 2: 8.2 % — SIGNIFICANT CHANGE UP
% BETA: 11.4 % — SIGNIFICANT CHANGE UP
% GAMMA: 15.4 % — SIGNIFICANT CHANGE UP
ALBUMIN SERPL ELPH-MCNC: 3.7 G/DL — SIGNIFICANT CHANGE UP (ref 3.6–5.5)
ALBUMIN/GLOB SERPL ELPH: 1.5 RATIO — SIGNIFICANT CHANGE UP
ALPHA1 GLOB SERPL ELPH-MCNC: 0.3 G/DL — SIGNIFICANT CHANGE UP (ref 0.1–0.4)
ALPHA2 GLOB SERPL ELPH-MCNC: 0.5 G/DL — SIGNIFICANT CHANGE UP (ref 0.5–1)
B-GLOBULIN SERPL ELPH-MCNC: 0.7 G/DL — SIGNIFICANT CHANGE UP (ref 0.5–1)
GAMMA GLOBULIN: 1 G/DL — SIGNIFICANT CHANGE UP (ref 0.6–1.6)
PROT PATTERN SERPL ELPH-IMP: SIGNIFICANT CHANGE UP

## 2019-04-22 LAB
CULTURE RESULTS: SIGNIFICANT CHANGE UP
CULTURE RESULTS: SIGNIFICANT CHANGE UP
LKM AB SER-ACNC: <20.1 UNITS — SIGNIFICANT CHANGE UP (ref 0–20)
MITOCHONDRIA AB SER-ACNC: SIGNIFICANT CHANGE UP
SMOOTH MUSCLE AB SER-ACNC: SIGNIFICANT CHANGE UP
SPECIMEN SOURCE: SIGNIFICANT CHANGE UP
SPECIMEN SOURCE: SIGNIFICANT CHANGE UP

## 2019-04-23 DIAGNOSIS — Z90.49 ACQUIRED ABSENCE OF OTHER SPECIFIED PARTS OF DIGESTIVE TRACT: ICD-10-CM

## 2019-04-23 DIAGNOSIS — Z82.49 FAMILY HISTORY OF ISCHEMIC HEART DISEASE AND OTHER DISEASES OF THE CIRCULATORY SYSTEM: ICD-10-CM

## 2019-04-23 DIAGNOSIS — Z84.81 FAMILY HISTORY OF CARRIER OF GENETIC DISEASE: ICD-10-CM

## 2019-04-23 DIAGNOSIS — D57.00 HB-SS DISEASE WITH CRISIS, UNSPECIFIED: ICD-10-CM

## 2019-04-23 DIAGNOSIS — R74.0 NONSPECIFIC ELEVATION OF LEVELS OF TRANSAMINASE AND LACTIC ACID DEHYDROGENASE [LDH]: ICD-10-CM

## 2019-04-23 DIAGNOSIS — R17 UNSPECIFIED JAUNDICE: ICD-10-CM

## 2019-04-23 DIAGNOSIS — N64.59 OTHER SIGNS AND SYMPTOMS IN BREAST: ICD-10-CM

## 2019-04-23 DIAGNOSIS — T50.995A ADVERSE EFFECT OF OTHER DRUGS, MEDICAMENTS AND BIOLOGICAL SUBSTANCES, INITIAL ENCOUNTER: ICD-10-CM

## 2019-04-23 DIAGNOSIS — Z90.81 ACQUIRED ABSENCE OF SPLEEN: ICD-10-CM

## 2019-04-23 LAB — ANA TITR SER: NEGATIVE — SIGNIFICANT CHANGE UP

## 2019-04-25 LAB
HCV RNA SERPL NAA DL=5-ACNC: SIGNIFICANT CHANGE UP IU/ML
HCV RNA SPEC NAA+PROBE-LOG IU: SIGNIFICANT CHANGE UP LOGIU/ML

## 2019-05-10 ENCOUNTER — INPATIENT (INPATIENT)
Facility: HOSPITAL | Age: 29
LOS: 2 days | Discharge: HOME | End: 2019-05-13
Attending: HOSPITALIST | Admitting: HOSPITALIST
Payer: COMMERCIAL

## 2019-05-10 VITALS
HEART RATE: 101 BPM | DIASTOLIC BLOOD PRESSURE: 60 MMHG | TEMPERATURE: 99 F | RESPIRATION RATE: 18 BRPM | SYSTOLIC BLOOD PRESSURE: 115 MMHG | OXYGEN SATURATION: 98 %

## 2019-05-10 DIAGNOSIS — Z90.81 ACQUIRED ABSENCE OF SPLEEN: Chronic | ICD-10-CM

## 2019-05-10 DIAGNOSIS — Z90.49 ACQUIRED ABSENCE OF OTHER SPECIFIED PARTS OF DIGESTIVE TRACT: Chronic | ICD-10-CM

## 2019-05-10 LAB
ALBUMIN SERPL ELPH-MCNC: 4.9 G/DL — SIGNIFICANT CHANGE UP (ref 3.5–5.2)
ALP SERPL-CCNC: 93 U/L — SIGNIFICANT CHANGE UP (ref 30–115)
ALT FLD-CCNC: 177 U/L — HIGH (ref 0–41)
ANION GAP SERPL CALC-SCNC: 16 MMOL/L — HIGH (ref 7–14)
ANISOCYTOSIS BLD QL: SLIGHT — SIGNIFICANT CHANGE UP
APPEARANCE UR: ABNORMAL
AST SERPL-CCNC: 215 U/L — HIGH (ref 0–41)
BACTERIA # UR AUTO: ABNORMAL /HPF
BASOPHILS # BLD AUTO: 0 K/UL — SIGNIFICANT CHANGE UP (ref 0–0.2)
BASOPHILS NFR BLD AUTO: 0 % — SIGNIFICANT CHANGE UP (ref 0–1)
BILIRUB SERPL-MCNC: 7.7 MG/DL — HIGH (ref 0.2–1.2)
BILIRUB UR-MCNC: NEGATIVE — SIGNIFICANT CHANGE UP
BLD GP AB SCN SERPL QL: SIGNIFICANT CHANGE UP
BUN SERPL-MCNC: 13 MG/DL — SIGNIFICANT CHANGE UP (ref 10–20)
CALCIUM SERPL-MCNC: 9.6 MG/DL — SIGNIFICANT CHANGE UP (ref 8.5–10.1)
CHLORIDE SERPL-SCNC: 98 MMOL/L — SIGNIFICANT CHANGE UP (ref 98–110)
CO2 SERPL-SCNC: 20 MMOL/L — SIGNIFICANT CHANGE UP (ref 17–32)
COLOR SPEC: SIGNIFICANT CHANGE UP
CREAT SERPL-MCNC: 0.7 MG/DL — SIGNIFICANT CHANGE UP (ref 0.7–1.5)
DIFF PNL FLD: NEGATIVE — SIGNIFICANT CHANGE UP
ELLIPTOCYTES BLD QL SMEAR: SLIGHT — SIGNIFICANT CHANGE UP
EOSINOPHIL # BLD AUTO: 0 K/UL — SIGNIFICANT CHANGE UP (ref 0–0.7)
EOSINOPHIL NFR BLD AUTO: 0 % — SIGNIFICANT CHANGE UP (ref 0–8)
EPI CELLS # UR: ABNORMAL /HPF
ERYTHROCYTE [SEDIMENTATION RATE] IN BLOOD: 24 MM/HR — HIGH (ref 0–15)
FLU A RESULT: NEGATIVE — SIGNIFICANT CHANGE UP
FLU A RESULT: NEGATIVE — SIGNIFICANT CHANGE UP
FLUAV AG NPH QL: NEGATIVE — SIGNIFICANT CHANGE UP
FLUBV AG NPH QL: NEGATIVE — SIGNIFICANT CHANGE UP
GIANT PLATELETS BLD QL SMEAR: PRESENT — SIGNIFICANT CHANGE UP
GLUCOSE SERPL-MCNC: 97 MG/DL — SIGNIFICANT CHANGE UP (ref 70–99)
GLUCOSE UR QL: NEGATIVE MG/DL — SIGNIFICANT CHANGE UP
HCT VFR BLD CALC: 26.2 % — LOW (ref 37–47)
HGB BLD-MCNC: 9.6 G/DL — LOW (ref 12–16)
HYPOCHROMIA BLD QL: SLIGHT — SIGNIFICANT CHANGE UP
KETONES UR-MCNC: NEGATIVE — SIGNIFICANT CHANGE UP
LACTATE SERPL-SCNC: 1 MMOL/L — SIGNIFICANT CHANGE UP (ref 0.5–2.2)
LDH SERPL L TO P-CCNC: 862 — HIGH (ref 50–242)
LEUKOCYTE ESTERASE UR-ACNC: NEGATIVE — SIGNIFICANT CHANGE UP
LYMPHOCYTES # BLD AUTO: 0.98 K/UL — LOW (ref 1.2–3.4)
LYMPHOCYTES # BLD AUTO: 6.1 % — LOW (ref 20.5–51.1)
MACROCYTES BLD QL: SIGNIFICANT CHANGE UP
MAGNESIUM SERPL-MCNC: 1.7 MG/DL — LOW (ref 1.8–2.4)
MANUAL SMEAR VERIFICATION: SIGNIFICANT CHANGE UP
MCHC RBC-ENTMCNC: 34.7 PG — HIGH (ref 27–31)
MCHC RBC-ENTMCNC: 36.6 G/DL — SIGNIFICANT CHANGE UP (ref 32–37)
MCV RBC AUTO: 94.6 FL — SIGNIFICANT CHANGE UP (ref 81–99)
MONOCYTES # BLD AUTO: 0.29 K/UL — SIGNIFICANT CHANGE UP (ref 0.1–0.6)
MONOCYTES NFR BLD AUTO: 1.8 % — SIGNIFICANT CHANGE UP (ref 1.7–9.3)
NEUTROPHILS # BLD AUTO: 14.1 K/UL — HIGH (ref 1.4–6.5)
NEUTROPHILS NFR BLD AUTO: 85.1 % — HIGH (ref 42.2–75.2)
NEUTS BAND # BLD: 2.6 % — SIGNIFICANT CHANGE UP (ref 0–6)
NITRITE UR-MCNC: NEGATIVE — SIGNIFICANT CHANGE UP
NRBC # BLD: 4 /100 — HIGH (ref 0–0)
NRBC # BLD: SIGNIFICANT CHANGE UP /100 WBCS (ref 0–0)
PH UR: 6.5 — SIGNIFICANT CHANGE UP (ref 5–8)
PLAT MORPH BLD: NORMAL — SIGNIFICANT CHANGE UP
PLATELET # BLD AUTO: 535 K/UL — HIGH (ref 130–400)
POIKILOCYTOSIS BLD QL AUTO: SLIGHT — SIGNIFICANT CHANGE UP
POLYCHROMASIA BLD QL SMEAR: SLIGHT — SIGNIFICANT CHANGE UP
POTASSIUM SERPL-MCNC: 6.6 MMOL/L — CRITICAL HIGH (ref 3.5–5)
POTASSIUM SERPL-SCNC: 6.6 MMOL/L — CRITICAL HIGH (ref 3.5–5)
PROT SERPL-MCNC: 7.7 G/DL — SIGNIFICANT CHANGE UP (ref 6–8)
PROT UR-MCNC: NEGATIVE MG/DL — SIGNIFICANT CHANGE UP
RBC # BLD: 2.77 M/UL — LOW (ref 4.2–5.4)
RBC # BLD: 2.77 M/UL — LOW (ref 4.2–5.4)
RBC # FLD: 17.2 % — HIGH (ref 11.5–14.5)
RBC BLD AUTO: ABNORMAL
RETICS #: 611.9 K/UL — HIGH (ref 25–125)
RETICS/RBC NFR: 22.1 % — HIGH (ref 0.5–1.5)
RSV RESULT: NEGATIVE — SIGNIFICANT CHANGE UP
RSV RNA RESP QL NAA+PROBE: NEGATIVE — SIGNIFICANT CHANGE UP
SICKLE CELLS BLD QL SMEAR: SLIGHT — SIGNIFICANT CHANGE UP
SODIUM SERPL-SCNC: 134 MMOL/L — LOW (ref 135–146)
SP GR SPEC: <=1.005 — SIGNIFICANT CHANGE UP (ref 1.01–1.03)
TYPE + AB SCN PNL BLD: SIGNIFICANT CHANGE UP
UROBILINOGEN FLD QL: 4 MG/DL (ref 0.2–0.2)
VARIANT LYMPHS # BLD: 4.4 % — SIGNIFICANT CHANGE UP (ref 0–5)
WBC # BLD: 16.08 K/UL — HIGH (ref 4.8–10.8)
WBC # FLD AUTO: 16.08 K/UL — HIGH (ref 4.8–10.8)

## 2019-05-10 PROCEDURE — 99285 EMERGENCY DEPT VISIT HI MDM: CPT

## 2019-05-10 PROCEDURE — 71046 X-RAY EXAM CHEST 2 VIEWS: CPT | Mod: 26

## 2019-05-10 RX ORDER — ACETAMINOPHEN 500 MG
650 TABLET ORAL EVERY 6 HOURS
Refills: 0 | Status: DISCONTINUED | OUTPATIENT
Start: 2019-05-10 | End: 2019-05-13

## 2019-05-10 RX ORDER — CEFTRIAXONE 500 MG/1
INJECTION, POWDER, FOR SOLUTION INTRAMUSCULAR; INTRAVENOUS
Refills: 0 | Status: DISCONTINUED | OUTPATIENT
Start: 2019-05-10 | End: 2019-05-11

## 2019-05-10 RX ORDER — CEFTRIAXONE 500 MG/1
1 INJECTION, POWDER, FOR SOLUTION INTRAMUSCULAR; INTRAVENOUS ONCE
Refills: 0 | Status: COMPLETED | OUTPATIENT
Start: 2019-05-10 | End: 2019-05-10

## 2019-05-10 RX ORDER — CEFTRIAXONE 500 MG/1
2 INJECTION, POWDER, FOR SOLUTION INTRAMUSCULAR; INTRAVENOUS EVERY 24 HOURS
Refills: 0 | Status: DISCONTINUED | OUTPATIENT
Start: 2019-05-10 | End: 2019-05-10

## 2019-05-10 RX ORDER — ENOXAPARIN SODIUM 100 MG/ML
40 INJECTION SUBCUTANEOUS DAILY
Refills: 0 | Status: DISCONTINUED | OUTPATIENT
Start: 2019-05-10 | End: 2019-05-13

## 2019-05-10 RX ORDER — FOLIC ACID 0.8 MG
1 TABLET ORAL DAILY
Refills: 0 | Status: DISCONTINUED | OUTPATIENT
Start: 2019-05-10 | End: 2019-05-13

## 2019-05-10 RX ORDER — SODIUM CHLORIDE 9 MG/ML
1000 INJECTION INTRAMUSCULAR; INTRAVENOUS; SUBCUTANEOUS ONCE
Refills: 0 | Status: COMPLETED | OUTPATIENT
Start: 2019-05-10 | End: 2019-05-10

## 2019-05-10 RX ORDER — MAGNESIUM SULFATE 500 MG/ML
2 VIAL (ML) INJECTION ONCE
Refills: 0 | Status: COMPLETED | OUTPATIENT
Start: 2019-05-10 | End: 2019-05-10

## 2019-05-10 RX ORDER — CEFTRIAXONE 500 MG/1
2 INJECTION, POWDER, FOR SOLUTION INTRAMUSCULAR; INTRAVENOUS EVERY 24 HOURS
Refills: 0 | Status: DISCONTINUED | OUTPATIENT
Start: 2019-05-11 | End: 2019-05-11

## 2019-05-10 RX ORDER — SODIUM CHLORIDE 9 MG/ML
1000 INJECTION INTRAMUSCULAR; INTRAVENOUS; SUBCUTANEOUS
Refills: 0 | Status: DISCONTINUED | OUTPATIENT
Start: 2019-05-10 | End: 2019-05-12

## 2019-05-10 RX ORDER — CEFTRIAXONE 500 MG/1
2 INJECTION, POWDER, FOR SOLUTION INTRAMUSCULAR; INTRAVENOUS ONCE
Refills: 0 | Status: COMPLETED | OUTPATIENT
Start: 2019-05-10 | End: 2019-05-10

## 2019-05-10 RX ADMIN — ENOXAPARIN SODIUM 40 MILLIGRAM(S): 100 INJECTION SUBCUTANEOUS at 18:27

## 2019-05-10 RX ADMIN — CEFTRIAXONE 100 GRAM(S): 500 INJECTION, POWDER, FOR SOLUTION INTRAMUSCULAR; INTRAVENOUS at 17:29

## 2019-05-10 RX ADMIN — SODIUM CHLORIDE 100 MILLILITER(S): 9 INJECTION INTRAMUSCULAR; INTRAVENOUS; SUBCUTANEOUS at 18:22

## 2019-05-10 RX ADMIN — Medication 50 GRAM(S): at 17:30

## 2019-05-10 RX ADMIN — SODIUM CHLORIDE 2000 MILLILITER(S): 9 INJECTION INTRAMUSCULAR; INTRAVENOUS; SUBCUTANEOUS at 14:45

## 2019-05-10 RX ADMIN — Medication 650 MILLIGRAM(S): at 18:27

## 2019-05-10 RX ADMIN — CEFTRIAXONE 100 GRAM(S): 500 INJECTION, POWDER, FOR SOLUTION INTRAMUSCULAR; INTRAVENOUS at 18:32

## 2019-05-10 NOTE — ED PROVIDER NOTE - ATTENDING CONTRIBUTION TO CARE
I personally evaluated the patient. I reviewed the Resident’s or Physician Assistant’s note (as assigned above), and agree with the findings and plan except as documented in my note.     28 female here for fever. Known Sickle Cell disease. Admits to rigors. No other symptoms.     Social: sick contacts noted.   ROS: denies cough, urinary symptoms, neck pain, neuro symptoms.     PE: female in no distress. nontoxic. HEENT: deeply icteric. nares normal. normal mucosa. CHEST: CTA bilateral CV: pulses intact. ABD; soft, non rigid, no guarding. SKIN: warm to touch, no rash. NEURO: AAO 3 no focal deficits.     Impression: sickle cell crisis,  fever    Plan: IV labs imaging supportive care and reevaluation, IV ABX, likely admission I personally evaluated the patient. I reviewed the Resident’s or Physician Assistant’s note (as assigned above), and agree with the findings and plan except as documented in my note.     28 female here for fever. Known Sickle Cell disease. Admits to rigors. No other symptoms.     Social: sick contacts noted.   ROS: denies cough, urinary symptoms, neck pain, neuro symptoms.     PE: female in no distress. nontoxic. HEENT: deeply icteric sclera. nares normal. normal mucosa. CHEST: CTA bilateral CV: pulses intact. ABD; soft, non rigid, no guarding. SKIN: warm to touch, no rash. NEURO: AAO 3 no focal deficits.     Impression: sickle cell crisis,  fever    Plan: IV labs imaging supportive care and reevaluation, IV ABX, likely admission

## 2019-05-10 NOTE — H&P ADULT - NSICDXFAMILYHX_GEN_ALL_CORE_FT
FAMILY HISTORY:  Father  Still living? Unknown  Family history of sickle cell trait in father, Age at diagnosis: Age Unknown    Mother  Still living? Unknown  Family history of sickle cell trait in mother, Age at diagnosis: Age Unknown

## 2019-05-10 NOTE — ED ADULT TRIAGE NOTE - CHIEF COMPLAINT QUOTE
fever for one day, alert and in no distress. history of sickle cell fever for one day, alert and in no distress. history of sickle cell. denies rash

## 2019-05-10 NOTE — H&P ADULT - ATTENDING COMMENTS
PT seen and examined independently at bedside. Patient has been experience watery BM since last night, about 4 episodes. She has no nausea or vomiting. Her BM are preceeded by "stomach gurgling". 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 Her myalgias have completely resolved. She denies SOB, arthralgias. Does not feel that she is having a sickle crisis.    PHYSICAL EXAM:  GENERAL: NAD, speaks in full sentences, no signs of respiratory distress  HEAD:  Atraumatic, Normocephalic  EYES: Anicteric  NECK: Supple, No JVD  CHEST/LUNG: Clear to auscultation bilaterally; No wheeze; No crackles; No accessory muscles used  HEART: Regular rate and rhythm; No murmurs;   ABDOMEN: Soft, Nontender, Nondistended; Bowel sounds present; No guarding  EXTREMITIES:  2+ Peripheral Pulses, No cyanosis or edema  PSYCH: AAOx3  NEUROLOGY: non-focal  SKIN: No rashes or lesions    Sepsis present on admission  -resolved  -possibly due to gastroenteritis  -fever improved, leukocytes trended down, no longer tachy  -observe off antibiotics    Diarrhea  -if it persists will send for C.diff as pt recently hospitalized  -monitor electrolytes    Sickle Cell  -LDH, retic, suggests acute crisis  -bili lower than previous admission  -pt however does not have typical symptoms  -c/w IVF for now  -hematology consult Dr. Gloria's team   -should likely start hydroxyurea  -c/w folate    Suspected iron overload  -Previous MRCP shows diffusely signal abnormality involving the liver and bone marrow, suggestive of iron deposition   -check iron levels, transferrin 1 month ago normal  -may need BM biopsy?    Transaminitis  -ggt pending  -LFTs trending down since admission  -had complete GI work up last admission therefore hold off repeat US for now    Continue to monitor PT seen and examined independently at bedside. Patient has been experience watery BM since last night, about 4 episodes. She has no nausea or vomiting. Her BM are preceeded by "stomach gurgling". 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 Her myalgias have completely resolved. She denies SOB, arthralgias. Does not feel that she is having a sickle crisis. Headache improved from yesterday, no photo or phono phobia, no neck stiffness    PHYSICAL EXAM:  GENERAL: NAD, speaks in full sentences, no signs of respiratory distress  HEAD:  Atraumatic, Normocephalic  EYES: Anicteric  NECK: Supple, No JVD  CHEST/LUNG: Clear to auscultation bilaterally; No wheeze; No crackles; No accessory muscles used  HEART: Regular rate and rhythm; No murmurs;   ABDOMEN: Soft, Nontender, Nondistended; Bowel sounds present; No guarding  EXTREMITIES:  2+ Peripheral Pulses, No cyanosis or edema  PSYCH: AAOx3  NEUROLOGY: non-focal, kernig, bruzinski signs negative  SKIN: No rashes or lesions    Sepsis present on admission  -resolved  -possibly due to gastroenteritis  -fever improved, leukocytes trended down, no longer tachy  -observe off antibiotics    Diarrhea  -if it persists will send for C.diff as pt recently hospitalized  -monitor electrolytes    Sickle Cell  -LDH, retic, suggests acute crisis  -bili lower than previous admission  -pt however does not have typical symptoms  -c/w IVF for now  -hematology consult Dr. Gloria's team   -should likely start hydroxyurea  -c/w folate    Headache  -doubt menigitis/encephalitis  -pain control    Suspected iron overload  -Previous MRCP shows diffusely signal abnormality involving the liver and bone marrow, suggestive of iron deposition   -check iron levels, transferrin 1 month ago normal  -may need BM biopsy?    Transaminitis  -ggt pending  -LFTs trending down since admission  -had complete GI work up last admission therefore hold off repeat US for now    Continue to monitor

## 2019-05-10 NOTE — H&P ADULT - NSHPPHYSICALEXAM_GEN_ALL_CORE
PHYSICAL EXAM:  GEN: No acute distress  HEENT: EOMI. No sclera icterus.   LUNGS: Clear to auscultation bilaterally   HEART: S1/S2 present. RRR. No murmur/gallop/rubs.   ABD: Soft, non-tender, non-distended. Bowel sounds present.   EXT: No pitting edema.   NEURO: AAOX3. Non focal. PHYSICAL EXAM:  GEN: No acute distress.   HEENT: EOMI. + sclera icterus.   LUNGS: Clear to auscultation bilaterally. No wheeze/rales/crackles.   HEART: S1/S2 present. RRR. No murmur/gallop/rubs.   ABD: Soft, non-tender, non-distended. Bowel sounds present.   EXT: No pitting edema.   NEURO: AAOX3. Non focal.

## 2019-05-10 NOTE — H&P ADULT - NSHPREVIEWOFSYSTEMS_GEN_ALL_CORE
REVIEW OF SYSTEMS:    CONSTITUTIONAL: No weakness, fevers or chills  EYES/ENT: No visual changes;  No vertigo or throat pain   NECK: No pain or stiffness  RESPIRATORY: No cough, wheezing, hemoptysis; No shortness of breath  CARDIOVASCULAR: No chest pain or palpitations  GASTROINTESTINAL: No abdominal or epigastric pain. No nausea, vomiting, or hematemesis; No diarrhea or constipation. No melena or hematochezia.  GENITOURINARY: No dysuria, frequency or hematuria  NEUROLOGICAL: No numbness or weakness  SKIN: No itching, rashes REVIEW OF SYSTEMS:    CONSTITUTIONAL: + weakness/fatigue. + fever, + chills. + generalized myalgia.   EYES/ENT: No visual changes;  No vertigo or throat pain   NECK: No pain or stiffness  RESPIRATORY: No cough, wheezing, hemoptysis; No shortness of breath  CARDIOVASCULAR: No chest pain or palpitations  GASTROINTESTINAL: No abdominal or epigastric pain. No nausea, vomiting, or hematemesis; No diarrhea or constipation. No melena or hematochezia.  GENITOURINARY: No dysuria, frequency or hematuria  NEUROLOGICAL: No numbness or weakness  SKIN: No itching, rashes.

## 2019-05-10 NOTE — ED PROVIDER NOTE - CLINICAL SUMMARY MEDICAL DECISION MAKING FREE TEXT BOX
28 female here for fever, known sickle cell disease. Appears well but concern for acute chest crisis based on HPI.  Has screening labs, imaging, IVF, IV ABX, and d/w on call Oncology & Hospitalist service, will admit to inpatient status.

## 2019-05-10 NOTE — H&P ADULT - NSICDXPASTMEDICALHX_GEN_ALL_CORE_FT
PAST MEDICAL HISTORY:  Sickle cell anemia PAST MEDICAL HISTORY:  Sickle cell anemia     Transaminitis

## 2019-05-10 NOTE — ED PROVIDER NOTE - OBJECTIVE STATEMENT
27 y/o female with PMHx of sickle cell anemia presents to the ED with fever of 102 since this morning. patient is also c/o chills and generalized body aches. She is c/o of mild sore throat. patient took ibuprofen this monring and came to the ED. She reports sick contact with the flu. Otherwise she denies CP, SOB, cough, n/v/d, bowel and urinary symptoms.   patient was admitted last April for acute david cell crisis.

## 2019-05-10 NOTE — ED PROVIDER NOTE - PROGRESS NOTE DETAILS
patient with elevated WBC.  Given her fever and elevated WBC and no clear source of infection. Will admit to medicine.

## 2019-05-10 NOTE — ED PROVIDER NOTE - FAMILY HISTORY
Father  Still living? Unknown  Family history of sickle cell trait in father, Age at diagnosis: Age Unknown     Mother  Still living? Unknown  Family history of sickle cell trait in mother, Age at diagnosis: Age Unknown

## 2019-05-10 NOTE — H&P ADULT - ASSESSMENT
29 y/o female with PMHx of sickle cell anemia (not on treatment) presents to the ED with fever of 102 since this morning. patient is also c/o chills and generalized body aches. She is c/o of mild sore throat. patient took ibuprofen this monring and came to the ED. She reports sick contact with the flu. Otherwise she denies CP, SOB, cough, n/v/d, bowel and urinary symptoms.   patient was admitted last April for acute david cell crisis.      Sepsis   - tachycardia and leukocytosis  - UA negative. CXR: unremarkable.  - Rapid flu swab negative.   - check BCx, RVP   - start on Unasyn   - check Duplex b/l LE   - c/w supportive care   - admit to medicine.    Sickle cell anemia   - Hb stable ~ 9.0.   - not in crisis.       DVT ppx: Lovenox SC  GI ppx: not indicated.   Activity: OOBC  Diet: Regular diet 27 y/o female with PMHx of sickle cell anemia (not on treatment) presents to the ED with fever of 102 since this morning. patient is also c/o chills and generalized body aches. She is c/o of mild sore throat. patient took ibuprofen this monring and came to the ED. She reports sick contact with the flu. Otherwise she denies CP, SOB, cough, n/v/d, bowel and urinary symptoms.   patient was admitted last April for acute david cell crisis.      Sepsis   - tachycardia and leukocytosis  - UA negative. CXR: unremarkable.  - Rapid flu swab negative.   - check BCx, RVP   - start on Unasyn   - check Duplex b/l LE   - c/w supportive care   - admit to medicine.    Transaminitis with predominant hyperbilirubinemia   - rule out benign hyperbilirubinemia secondary to sickle cell disease vs DILI   - Patient had w/u recently. Hepatitis B/C negative.   - HOLLI/AMA/anti-smooth muscle abs negative.    - US of liver - unremarkable.   - MRCP done: No evidence for biliary ductal dilation nor choledocholithiasis.  Diffusely signal abnormality involving the liver and bone marrow, suggestive of iron deposition (reticuloendothelial deposition pattern).  - Ophtha consulted for KF rings for ban ds- no KF rings seen  - following GI outpatient.     Sickle cell anemia   - Hb stable ~ 9.0.   - not in crisis.       DVT ppx: Lovenox SC  GI ppx: not indicated.   Activity: OOBC  Diet: Regular diet 27 yo F with hx of sickle cell anemia HbSS (not on treatment) and Transaminitis presents to the ED with fever. Patient reports that she was feeling fine last night. However, she woke up this morning feeling tired, fatigue with chills, generalized myalgia and bodyache a/w headache. Patient has recent sick contact (her friend has flu A). Patient measure her Temperature and found to have fever of 101.7 at work. So she went to , who prompt her to come to ED for further evaluation. She received a dose of ibuprofen with improvement in symptoms. Had recent hospitalization for sickle cell crisis w/o vasoocclusive symptoms in April 2019. Patient denied chest pain, SOB, palpitation, abdominal pain, N/V, change in bowel movement, change in urination.       Sepsis likely 2/2 viral infection (r/o underlined bacteria infection)  - Tachycardia and leukocytosis.   - UA negative. CXR: unremarkable.  - Rapid flu swab negative.   - will check RVP   - check BCx  - start on ceftriaxone 2g q24h   - check Duplex b/l LE   - c/w IVF NS @ 100cc/hr   - c/w supportive care   - ID consulted.   - admit to medicine.    Transaminitis with predominant hyperbilirubinemia   - rule out benign hyperbilirubinemia secondary to sickle cell disease vs DILI   - Patient had w/u recently. Hepatitis B/C negative.   - HOLLI/AMA/anti-smooth muscle abs/MLK abs: negative.    - US of liver - unremarkable.   - MRCP done: No evidence for biliary ductal dilation nor choledocholithiasis.  Diffusely signal abnormality involving the liver and bone marrow, suggestive of iron deposition (reticuloendothelial deposition pattern).  - Ophtha consulted for KF rings for ban ds- no KF rings seen.   - monitor LFT   - following GI outpatient --> consider GI eval in AM if LFT worsen.   - As per Heme/Onc from previous admission: jaundice and hyperbilirubinemia likely drug induced from patch for birth control.     Sickle cell anemia   - Hb stable ~ 9.0.   - check reti count   -   - patient denied any pain. Denied chest pain/SOB/vision changes.   - CXR negative.   - c/w IVF NS @ 100cc/hr  - c/w folic acid   - Hematology consulted.       DVT ppx: Lovenox SC  GI ppx: not indicated.   Activity: OOBC  Diet: Regular diet

## 2019-05-10 NOTE — H&P ADULT - NSHPLABSRESULTS_GEN_ALL_CORE
9.6    16.08 )-----------( 535      ( 10 May 2019 14:31 )             26.2             05-10    134<L>  |  98  |  13  ----------------------------<  97  6.6<HH>   |  20  |  0.7    Ca    9.6      10 May 2019 14:31  Mg     1.7     05-10    TPro  7.7  /  Alb  4.9  /  TBili  7.7<H>  /  DBili  x   /  AST  215<H>  /  ALT  177<H>  /  AlkPhos  93  05-10    LIVER FUNCTIONS - ( 10 May 2019 14:31 )  Alb: 4.9 g/dL / Pro: 7.7 g/dL / ALK PHOS: 93 U/L / ALT: 177 U/L / AST: 215 U/L / GGT: x                         Urinalysis Basic - ( 10 May 2019 14:31 )    Color: Dark Yellow / Appearance: Cloudy / SG: <=1.005 / pH: x  Gluc: x / Ketone: Negative  / Bili: Negative / Urobili: 4.0 mg/dL   Blood: x / Protein: Negative mg/dL / Nitrite: Negative   Leuk Esterase: Negative / RBC: x / WBC x   Sq Epi: x / Non Sq Epi: Occasional /HPF / Bacteria: Few /HPF

## 2019-05-10 NOTE — H&P ADULT - HISTORY OF PRESENT ILLNESS
27 y/o female with PMHx of sickle cell anemia (not on treatment) presents to the ED with fever of 102 since this morning. patient is also c/o chills and generalized body aches. She is c/o of mild sore throat. patient took ibuprofen this monring and came to the ED. She reports sick contact with the flu. Otherwise she denies CP, SOB, cough, n/v/d, bowel and urinary symptoms.   patient was admitted last April for acute david cell crisis. 29 yo F with hx of sickle cell anemia HbSS (not on treatment) and Transaminitis presents to the ED with fever. Patient reports that she was feeling fine last night. However, she woke up this morning feeling tired, fatigue with chills, generalized myalgia and bodyache a/w headache. Patient has recent sick contact (her friend has flu A). Patient measure her Temperature and found to have fever of 101.7 at work. So she went to , who prompt her to come to ED for further evaluation. She received a dose of ibuprofen with improvement in symptoms. Had recent hospitalization for sickle cell crisis w/o vasoocclusive symptoms in April 2019. Patient denied chest pain, SOB, palpitation, abdominal pain, N/V, change in bowel movement, change in urination.

## 2019-05-11 LAB
ALBUMIN SERPL ELPH-MCNC: 4.3 G/DL — SIGNIFICANT CHANGE UP (ref 3.5–5.2)
ALP SERPL-CCNC: 101 U/L — SIGNIFICANT CHANGE UP (ref 30–115)
ALT FLD-CCNC: 136 U/L — HIGH (ref 0–41)
ANION GAP SERPL CALC-SCNC: 13 MMOL/L — SIGNIFICANT CHANGE UP (ref 7–14)
ANION GAP SERPL CALC-SCNC: 13 MMOL/L — SIGNIFICANT CHANGE UP (ref 7–14)
AST SERPL-CCNC: 118 U/L — HIGH (ref 0–41)
BASOPHILS # BLD AUTO: 0.02 K/UL — SIGNIFICANT CHANGE UP (ref 0–0.2)
BASOPHILS NFR BLD AUTO: 0.3 % — SIGNIFICANT CHANGE UP (ref 0–1)
BILIRUB DIRECT SERPL-MCNC: 2.5 MG/DL — HIGH (ref 0–0.2)
BILIRUB INDIRECT FLD-MCNC: 4.5 MG/DL — HIGH (ref 0.2–1.2)
BILIRUB SERPL-MCNC: 7 MG/DL — HIGH (ref 0.2–1.2)
BUN SERPL-MCNC: 12 MG/DL — SIGNIFICANT CHANGE UP (ref 10–20)
BUN SERPL-MCNC: 14 MG/DL — SIGNIFICANT CHANGE UP (ref 10–20)
C DIFF BY PCR RESULT: NEGATIVE — SIGNIFICANT CHANGE UP
C DIFF TOX GENS STL QL NAA+PROBE: SIGNIFICANT CHANGE UP
CALCIUM SERPL-MCNC: 8.5 MG/DL — SIGNIFICANT CHANGE UP (ref 8.5–10.1)
CALCIUM SERPL-MCNC: 8.8 MG/DL — SIGNIFICANT CHANGE UP (ref 8.5–10.1)
CHLORIDE SERPL-SCNC: 104 MMOL/L — SIGNIFICANT CHANGE UP (ref 98–110)
CHLORIDE SERPL-SCNC: 105 MMOL/L — SIGNIFICANT CHANGE UP (ref 98–110)
CO2 SERPL-SCNC: 19 MMOL/L — SIGNIFICANT CHANGE UP (ref 17–32)
CO2 SERPL-SCNC: 20 MMOL/L — SIGNIFICANT CHANGE UP (ref 17–32)
CREAT SERPL-MCNC: 0.6 MG/DL — LOW (ref 0.7–1.5)
CREAT SERPL-MCNC: 0.7 MG/DL — SIGNIFICANT CHANGE UP (ref 0.7–1.5)
CRP SERPL-MCNC: 1.89 MG/DL — HIGH (ref 0–0.4)
CULTURE RESULTS: NO GROWTH — SIGNIFICANT CHANGE UP
EOSINOPHIL # BLD AUTO: 0 K/UL — SIGNIFICANT CHANGE UP (ref 0–0.7)
EOSINOPHIL NFR BLD AUTO: 0 % — SIGNIFICANT CHANGE UP (ref 0–8)
GGT SERPL-CCNC: 166 U/L — HIGH (ref 1–40)
GLUCOSE SERPL-MCNC: 84 MG/DL — SIGNIFICANT CHANGE UP (ref 70–99)
GLUCOSE SERPL-MCNC: 92 MG/DL — SIGNIFICANT CHANGE UP (ref 70–99)
HAPTOGLOB SERPL-MCNC: <20 MG/DL — LOW (ref 34–200)
HCT VFR BLD CALC: 22 % — LOW (ref 37–47)
HGB BLD-MCNC: 8 G/DL — LOW (ref 12–16)
IMM GRANULOCYTES NFR BLD AUTO: 1.6 % — HIGH (ref 0.1–0.3)
LYMPHOCYTES # BLD AUTO: 0.96 K/UL — LOW (ref 1.2–3.4)
LYMPHOCYTES # BLD AUTO: 12 % — LOW (ref 20.5–51.1)
MAGNESIUM SERPL-MCNC: 2.2 MG/DL — SIGNIFICANT CHANGE UP (ref 1.8–2.4)
MCHC RBC-ENTMCNC: 34.9 PG — HIGH (ref 27–31)
MCHC RBC-ENTMCNC: 36.4 G/DL — SIGNIFICANT CHANGE UP (ref 32–37)
MCV RBC AUTO: 96.1 FL — SIGNIFICANT CHANGE UP (ref 81–99)
MONOCYTES # BLD AUTO: 0.38 K/UL — SIGNIFICANT CHANGE UP (ref 0.1–0.6)
MONOCYTES NFR BLD AUTO: 4.8 % — SIGNIFICANT CHANGE UP (ref 1.7–9.3)
NEUTROPHILS # BLD AUTO: 6.48 K/UL — SIGNIFICANT CHANGE UP (ref 1.4–6.5)
NEUTROPHILS NFR BLD AUTO: 81.3 % — HIGH (ref 42.2–75.2)
NRBC # BLD: 5 /100 WBCS — HIGH (ref 0–0)
PLATELET # BLD AUTO: 442 K/UL — HIGH (ref 130–400)
POTASSIUM SERPL-MCNC: 4.3 MMOL/L — SIGNIFICANT CHANGE UP (ref 3.5–5)
POTASSIUM SERPL-MCNC: 4.9 MMOL/L — SIGNIFICANT CHANGE UP (ref 3.5–5)
POTASSIUM SERPL-SCNC: 4.3 MMOL/L — SIGNIFICANT CHANGE UP (ref 3.5–5)
POTASSIUM SERPL-SCNC: 4.9 MMOL/L — SIGNIFICANT CHANGE UP (ref 3.5–5)
PROT SERPL-MCNC: 6.7 G/DL — SIGNIFICANT CHANGE UP (ref 6–8)
RAPID RVP RESULT: SIGNIFICANT CHANGE UP
RBC # BLD: 2.29 M/UL — LOW (ref 4.2–5.4)
RBC # FLD: 17.2 % — HIGH (ref 11.5–14.5)
SODIUM SERPL-SCNC: 137 MMOL/L — SIGNIFICANT CHANGE UP (ref 135–146)
SODIUM SERPL-SCNC: 137 MMOL/L — SIGNIFICANT CHANGE UP (ref 135–146)
SPECIMEN SOURCE: SIGNIFICANT CHANGE UP
TRANSFERRIN SERPL-MCNC: 185 MG/DL — LOW (ref 200–360)
WBC # BLD: 7.97 K/UL — SIGNIFICANT CHANGE UP (ref 4.8–10.8)
WBC # FLD AUTO: 7.97 K/UL — SIGNIFICANT CHANGE UP (ref 4.8–10.8)

## 2019-05-11 PROCEDURE — 93970 EXTREMITY STUDY: CPT | Mod: 26

## 2019-05-11 RX ADMIN — Medication 650 MILLIGRAM(S): at 21:26

## 2019-05-11 RX ADMIN — Medication 1 MILLIGRAM(S): at 11:46

## 2019-05-11 RX ADMIN — Medication 650 MILLIGRAM(S): at 09:39

## 2019-05-11 RX ADMIN — ENOXAPARIN SODIUM 40 MILLIGRAM(S): 100 INJECTION SUBCUTANEOUS at 11:46

## 2019-05-11 RX ADMIN — Medication 650 MILLIGRAM(S): at 19:50

## 2019-05-11 RX ADMIN — Medication 650 MILLIGRAM(S): at 13:55

## 2019-05-11 RX ADMIN — SODIUM CHLORIDE 100 MILLILITER(S): 9 INJECTION INTRAMUSCULAR; INTRAVENOUS; SUBCUTANEOUS at 04:59

## 2019-05-11 NOTE — PROGRESS NOTE ADULT - SUBJECTIVE AND OBJECTIVE BOX
SUBJECTIVE:  Patient is resting comfortably in bed upon approach. Appears in no acute distress. She states that she had 3 episodes of watery diarrhea overnight. No blood. No emesis. Her "stomach feels upset." She thinks she might have a virus. She endorses a mild headache, states it was a 10/10 last night but that it is a 3/10 today. No shortness of breath, chest pain, lightheadedness, dizziness, nausea, vomiting.    Has been hypotensive overnight, and slightly tachycardic.    Has mild LUQ abdominal tenderness, states she has no spleen.     PAST MEDICAL & SURGICAL HISTORY  Transaminitis  Sickle cell anemia  History of cholecystectomy  H/O splenectomy    SOCIAL HISTORY:  Negative for smoking/alcohol/drug use.     ALLERGIES:  No Known Allergies    MEDICATIONS:  STANDING MEDICATIONS  cefTRIAXone   IVPB      cefTRIAXone   IVPB 2 Gram(s) IV Intermittent every 24 hours  enoxaparin Injectable 40 milliGRAM(s) SubCutaneous daily  folic acid 1 milliGRAM(s) Oral daily  sodium chloride 0.9%. 1000 milliLiter(s) IV Continuous <Continuous>    PRN MEDICATIONS  acetaminophen   Tablet .. 650 milliGRAM(s) Oral every 6 hours PRN  acetaminophen   Tablet .. 650 milliGRAM(s) Oral every 6 hours PRN    VITALS:   T(F): 100.3  HR: 98  BP: 91/54  RR: 18  SpO2: 98%    LABS:                        8.0    7.97  )-----------( 442      ( 11 May 2019 06:31 )             22.0     05-11    137  |  105  |  12  ----------------------------<  84  4.9   |  19  |  0.7    Ca    8.8      11 May 2019 06:31  Mg     2.2     05-11    TPro  6.7  /  Alb  4.3  /  TBili  7.0<H>  /  DBili  2.5<H>  /  AST  118<H>  /  ALT  136<H>  /  AlkPhos  101  05-11      Urinalysis Basic - ( 10 May 2019 14:31 )    Color: Dark Yellow / Appearance: Cloudy / SG: <=1.005 / pH: x  Gluc: x / Ketone: Negative  / Bili: Negative / Urobili: 4.0 mg/dL   Blood: x / Protein: Negative mg/dL / Nitrite: Negative   Leuk Esterase: Negative / RBC: x / WBC x   Sq Epi: x / Non Sq Epi: Occasional /HPF / Bacteria: Few /HPF        Sedimentation Rate, Erythrocyte: 24 mm/hr <H> (05-10-19 @ 23:12)  Lactate, Blood: 1.0 mmol/L (05-10-19 @ 14:31)          RADIOLOGY:    PHYSICAL EXAM:  GEN: No acute distress  LUNGS: Clear to auscultation bilaterally   HEART: S1/S2 present. RRR.   ABD: Mild LUQ tenderness   EXT: NC/NC/NE/2+PP/BEY  NEURO: AAOX3

## 2019-05-11 NOTE — PROGRESS NOTE ADULT - ASSESSMENT
29 yo F with hx of sickle cell anemia HbSS (not on treatment) and Transaminitis presents to the ED with fever.Temperature and found to have fever of 101.7 at work. Had recent hospitalization for sickle cell crisis w/o vasoocclusive symptoms in April 2019.     Sepsis likely 2/2 viral infection (r/o underlined bacteria infection)  - Luekocytosis improving, 16->7  - UA negative. CXR: unremarkable.  - Rapid flu and RVP swab negative.   - f/u blood cultures   - start on ceftriaxone 2g q24h   - f/u Duplex b/l LE   - c/w IVF NS @ 100cc/hr   - f/u ID     Transaminitis with predominant hyperbilirubinemia   - rule out benign hyperbilirubinemia secondary to sickle cell disease vs DILI   - Patient had w/u recently. Hepatitis B/C negative.   - HOLLI/AMA/anti-smooth muscle abs/MLK abs: negative.    - US of liver - unremarkable.   - MRCP done: No evidence for biliary ductal dilation nor choledocholithiasis.  Diffusely signal abnormality involving the liver and bone marrow, suggestive of iron deposition (reticuloendothelial deposition pattern).  - Ophtha consulted for KF rings for ban ds- no KF rings seen.   - monitor LFT   - following GI outpatient --> consider GI eval in AM if LFT worsen.   - As per Heme/Onc from previous admission: jaundice and hyperbilirubinemia likely drug induced from patch for birth control.     Sickle cell anemia   - Hb stable ~ 9.0.   - check reti count   -   - patient denied any pain. Denied chest pain/SOB/vision changes.   - CXR negative.   - c/w IVF NS @ 100cc/hr  - c/w folic acid   - Hematology consulted.       DVT ppx: Lovenox SC  GI ppx: not indicated.   Activity: OOBC  Diet: Regular diet

## 2019-05-11 NOTE — CONSULT NOTE ADULT - SUBJECTIVE AND OBJECTIVE BOX
pt admitted with fever chills   feels better on antibiotics   no nausea or vomiting   abd pain better

## 2019-05-11 NOTE — CONSULT NOTE ADULT - ASSESSMENT
lab work reviewed stable   no need of transfusion   pt advised to follow up with hem as out pt    pt stoped  birth control patch as was advised  thanks for calling   lon barrios MD  [phone]

## 2019-05-12 PROBLEM — D57.1 SICKLE-CELL DISEASE WITHOUT CRISIS: Chronic | Status: ACTIVE | Noted: 2019-04-16

## 2019-05-12 LAB
ALBUMIN SERPL ELPH-MCNC: 4.2 G/DL — SIGNIFICANT CHANGE UP (ref 3.5–5.2)
ALP SERPL-CCNC: 91 U/L — SIGNIFICANT CHANGE UP (ref 30–115)
ALT FLD-CCNC: 104 U/L — HIGH (ref 0–41)
ANION GAP SERPL CALC-SCNC: 12 MMOL/L — SIGNIFICANT CHANGE UP (ref 7–14)
AST SERPL-CCNC: 99 U/L — HIGH (ref 0–41)
BASOPHILS # BLD AUTO: 0.04 K/UL — SIGNIFICANT CHANGE UP (ref 0–0.2)
BASOPHILS NFR BLD AUTO: 0.4 % — SIGNIFICANT CHANGE UP (ref 0–1)
BILIRUB DIRECT SERPL-MCNC: 1.6 MG/DL — HIGH (ref 0–0.2)
BILIRUB INDIRECT FLD-MCNC: 3.1 MG/DL — HIGH (ref 0.2–1.2)
BILIRUB SERPL-MCNC: 4.7 MG/DL — HIGH (ref 0.2–1.2)
BUN SERPL-MCNC: 8 MG/DL — LOW (ref 10–20)
CALCIUM SERPL-MCNC: 8.8 MG/DL — SIGNIFICANT CHANGE UP (ref 8.5–10.1)
CHLORIDE SERPL-SCNC: 111 MMOL/L — HIGH (ref 98–110)
CO2 SERPL-SCNC: 13 MMOL/L — LOW (ref 17–32)
CREAT SERPL-MCNC: 0.7 MG/DL — SIGNIFICANT CHANGE UP (ref 0.7–1.5)
EOSINOPHIL # BLD AUTO: 0.01 K/UL — SIGNIFICANT CHANGE UP (ref 0–0.7)
EOSINOPHIL NFR BLD AUTO: 0.1 % — SIGNIFICANT CHANGE UP (ref 0–8)
GLUCOSE SERPL-MCNC: 72 MG/DL — SIGNIFICANT CHANGE UP (ref 70–99)
HCT VFR BLD CALC: 21.1 % — LOW (ref 37–47)
HGB BLD-MCNC: 7.7 G/DL — LOW (ref 12–16)
IMM GRANULOCYTES NFR BLD AUTO: 1.3 % — HIGH (ref 0.1–0.3)
LYMPHOCYTES # BLD AUTO: 2.16 K/UL — SIGNIFICANT CHANGE UP (ref 1.2–3.4)
LYMPHOCYTES # BLD AUTO: 22.9 % — SIGNIFICANT CHANGE UP (ref 20.5–51.1)
MAGNESIUM SERPL-MCNC: 2.1 MG/DL — SIGNIFICANT CHANGE UP (ref 1.8–2.4)
MCHC RBC-ENTMCNC: 34.4 PG — HIGH (ref 27–31)
MCHC RBC-ENTMCNC: 36.5 G/DL — SIGNIFICANT CHANGE UP (ref 32–37)
MCV RBC AUTO: 94.2 FL — SIGNIFICANT CHANGE UP (ref 81–99)
MONOCYTES # BLD AUTO: 0.93 K/UL — HIGH (ref 0.1–0.6)
MONOCYTES NFR BLD AUTO: 9.9 % — HIGH (ref 1.7–9.3)
NEUTROPHILS # BLD AUTO: 6.17 K/UL — SIGNIFICANT CHANGE UP (ref 1.4–6.5)
NEUTROPHILS NFR BLD AUTO: 65.4 % — SIGNIFICANT CHANGE UP (ref 42.2–75.2)
NRBC # BLD: 2 /100 WBCS — HIGH (ref 0–0)
PLATELET # BLD AUTO: 349 K/UL — SIGNIFICANT CHANGE UP (ref 130–400)
POTASSIUM SERPL-MCNC: 4.9 MMOL/L — SIGNIFICANT CHANGE UP (ref 3.5–5)
POTASSIUM SERPL-SCNC: 4.9 MMOL/L — SIGNIFICANT CHANGE UP (ref 3.5–5)
PROT SERPL-MCNC: 6.7 G/DL — SIGNIFICANT CHANGE UP (ref 6–8)
RBC # BLD: 2.24 M/UL — LOW (ref 4.2–5.4)
RBC # FLD: 17.8 % — HIGH (ref 11.5–14.5)
SODIUM SERPL-SCNC: 136 MMOL/L — SIGNIFICANT CHANGE UP (ref 135–146)
WBC # BLD: 9.43 K/UL — SIGNIFICANT CHANGE UP (ref 4.8–10.8)
WBC # FLD AUTO: 9.43 K/UL — SIGNIFICANT CHANGE UP (ref 4.8–10.8)

## 2019-05-12 PROCEDURE — 76705 ECHO EXAM OF ABDOMEN: CPT | Mod: 26

## 2019-05-12 RX ORDER — LOPERAMIDE HCL 2 MG
4 TABLET ORAL ONCE
Refills: 0 | Status: COMPLETED | OUTPATIENT
Start: 2019-05-12 | End: 2019-05-12

## 2019-05-12 RX ORDER — LOPERAMIDE HCL 2 MG
2 TABLET ORAL THREE TIMES A DAY
Refills: 0 | Status: DISCONTINUED | OUTPATIENT
Start: 2019-05-12 | End: 2019-05-13

## 2019-05-12 RX ADMIN — Medication 650 MILLIGRAM(S): at 18:54

## 2019-05-12 RX ADMIN — ENOXAPARIN SODIUM 40 MILLIGRAM(S): 100 INJECTION SUBCUTANEOUS at 11:12

## 2019-05-12 RX ADMIN — Medication 1 MILLIGRAM(S): at 11:13

## 2019-05-12 RX ADMIN — Medication 30 MILLILITER(S): at 21:14

## 2019-05-12 RX ADMIN — Medication 2 MILLIGRAM(S): at 19:21

## 2019-05-12 RX ADMIN — SODIUM CHLORIDE 100 MILLILITER(S): 9 INJECTION INTRAMUSCULAR; INTRAVENOUS; SUBCUTANEOUS at 04:30

## 2019-05-12 RX ADMIN — Medication 4 MILLIGRAM(S): at 11:38

## 2019-05-12 NOTE — PROGRESS NOTE ADULT - ASSESSMENT
27 yo F PMHx sickle cell, presents to the ED for evaluation of  fever and myalgias. Tmax prior to arrival was 101.7. 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 Had another fever last night 100.6     Sepsis likely 2/2 viral infection (r/o underlined bacteria infection)  - sepsis present on admission  - resolved  - likely gastroenteritis given fevers and diarrhea  - c. diff negative  - c/w IVF for now  - if pt remains afebrile today will d/c home tomorrow    Hyperchloremic metabolic acidosis  - bicarb on BMP trending down  - normal anion gap  - elevate chloride  - likely GI loss from diarrhea vs dilutional acidosis  - electrolytes stable  - asymptomatic  - will decrease fluid rate  - hold off abg, sodium bicarb as pt's history convincing of GI loss or diluational cause    Transaminitis with hyperbilirubinemia   - trending down  - extensive work up on admission last month  - bump in transaminases on this admission likely related to sepsis  - continue to monitor and f/u GI as outpatient  - previous rise attributed to birth control, pt has since stopped    Sickle cell anemia   - Hb trending down  - no evidence of pain crisis  - c/w IVF while pt having infection to prevent precipitation of crisis  - c/w folic acid   - Hematology consult appreciated    DVT ppx: Lovenox SC  GI ppx: not indicated.   Activity: OOBC  Diet: Regular diet 29 yo F PMHx sickle cell, presents to the ED for evaluation of  fever and myalgias. Tmax prior to arrival was 101.7. 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 Had another fever last night 100.6     Sepsis likely 2/2 viral infection (r/o underlined bacteria infection)  - sepsis present on admission  - resolved  - likely gastroenteritis given fevers and diarrhea  - c. diff negative  - would like to continue IVF for now but will hold off given hyperchloremic acidosis  - if pt remains afebrile today will d/c home tomorrow    Hyperchloremic metabolic acidosis  - bicarb on BMP trending down  - normal anion gap  - elevate chloride  - likely GI loss from diarrhea vs dilutional acidosis  - electrolytes stable  - asymptomatic  - hold fluids for now  - hold off abg, sodium bicarb as pt's history convincing of GI loss or dilutional cause    Transaminitis with hyperbilirubinemia   - trending down  - extensive work up on admission last month  - bump in transaminases on this admission likely related to sepsis  - continue to monitor and f/u GI as outpatient  - previous rise attributed to birth control, pt has since stopped    Sickle cell anemia   - Hb trending down  - no evidence of pain crisis  - c/w IVF while pt having infection to prevent precipitation of crisis  - c/w folic acid   - Hematology consult appreciated    DVT ppx: Lovenox SC  GI ppx: not indicated.   Activity: OOBC  Diet: Regular diet    #Progress Note Handoff:  Pending:  defervesce, acidosis improvement  Family discussion: d/w pt as outlined above  Disposition: Home___/SNF___/Other________/Unknown at this time________

## 2019-05-12 NOTE — PROGRESS NOTE ADULT - SUBJECTIVE AND OBJECTIVE BOX
CHIEF COMPLAINT:    Patient is a 28y old  Female who presents with a chief complaint of fever and generalized bodyache (11 May 2019 15:00)      INTERVAL HPI/OVERNIGHT EVENTS:    Patient seen and examined at bedside. No acute overnight events occurred.    ROS: All other systems are negative.    Vital Signs:    T(F): 98.6 (05-12-19 @ 14:36), Max: 100.6 (05-11-19 @ 19:51)  HR: 111 (05-12-19 @ 14:36) (88 - 111)  BP: 121/73 (05-12-19 @ 14:36) (97/53 - 121/73)  RR: 18 (05-12-19 @ 14:36) (18 - 18)  SpO2: 96% (05-11-19 @ 15:55) (96% - 96%)  I&O's Summary    Daily     Daily   CAPILLARY BLOOD GLUCOSE          PHYSICAL EXAM:  GENERAL:  NAD  SKIN: No rashes or lesions  HEENT: Atraumatic. Normocephalic. Anicteric  NECK:  No JVD.   PULMONARY: Clear to ausculation bilaterally. No wheezing. No rales  CVS: Normal S1, S2. Regular rate and rhythm. No murmurs.  ABDOMEN/GI: Soft, Nontender, Nondistended; Bowel sounds are present  EXTREMITIES:  No edema B/L LE.  NEUROLOGIC:  No motor deficit.  PSYCH: Alert & oriented x 3, normal affect    Consultant(s) Notes Reviewed:  [x ] YES  [ ] NO  Care Discussed with Consultants/Other Providers [ x] YES  [ ] NO    LABS:                        7.7    9.43  )-----------( 349      ( 12 May 2019 06:10 )             21.1     05-12    136  |  111<H>  |  8<L>  ----------------------------<  72  4.9   |  13<L>  |  0.7    Ca    8.8      12 May 2019 06:10  Mg     2.1     05-12    TPro  6.7  /  Alb  4.2  /  TBili  4.7<H>  /  DBili  1.6<H>  /  AST  99<H>  /  ALT  104<H>  /  AlkPhos  91  05-12            Culture - Urine (collected 10 May 2019 14:31)  Source: .Urine Clean Catch (Midstream)  Final Report (11 May 2019 22:38):    No growth    Culture - Blood (collected 10 May 2019 14:31)  Source: .Blood Blood  Preliminary Report (12 May 2019 03:50):    No growth to date.    Culture - Blood (collected 10 May 2019 14:31)  Source: .Blood Blood  Preliminary Report (12 May 2019 03:50):    No growth to date.        RADIOLOGY & ADDITIONAL TESTS:  Imaging or report Personally Reviewed:  [ ] YES  [ ] NO    EKG reviewed independently    Medications:  Standing  enoxaparin Injectable 40 milliGRAM(s) SubCutaneous daily  folic acid 1 milliGRAM(s) Oral daily  sodium chloride 0.9%. 1000 milliLiter(s) IV Continuous <Continuous>    PRN Meds  acetaminophen   Tablet .. 650 milliGRAM(s) Oral every 6 hours PRN  acetaminophen   Tablet .. 650 milliGRAM(s) Oral every 6 hours PRN  loperamide 2 milliGRAM(s) Oral three times a day PRN      Case discussed with resident  Care discussed with pt

## 2019-05-13 VITALS
DIASTOLIC BLOOD PRESSURE: 53 MMHG | SYSTOLIC BLOOD PRESSURE: 107 MMHG | TEMPERATURE: 100 F | RESPIRATION RATE: 17 BRPM | HEART RATE: 85 BPM

## 2019-05-13 LAB
ALBUMIN SERPL ELPH-MCNC: 4.1 G/DL — SIGNIFICANT CHANGE UP (ref 3.5–5.2)
ALP SERPL-CCNC: 88 U/L — SIGNIFICANT CHANGE UP (ref 30–115)
ALT FLD-CCNC: 80 U/L — HIGH (ref 0–41)
ANION GAP SERPL CALC-SCNC: 12 MMOL/L — SIGNIFICANT CHANGE UP (ref 7–14)
AST SERPL-CCNC: 94 U/L — HIGH (ref 0–41)
BILIRUB DIRECT SERPL-MCNC: 1.6 MG/DL — HIGH (ref 0–0.2)
BILIRUB INDIRECT FLD-MCNC: 4.5 MG/DL — HIGH (ref 0.2–1.2)
BILIRUB SERPL-MCNC: 6.1 MG/DL — HIGH (ref 0.2–1.2)
BUN SERPL-MCNC: 6 MG/DL — LOW (ref 10–20)
CALCIUM SERPL-MCNC: 9.1 MG/DL — SIGNIFICANT CHANGE UP (ref 8.5–10.1)
CHLORIDE SERPL-SCNC: 112 MMOL/L — HIGH (ref 98–110)
CO2 SERPL-SCNC: 13 MMOL/L — LOW (ref 17–32)
CREAT SERPL-MCNC: 0.7 MG/DL — SIGNIFICANT CHANGE UP (ref 0.7–1.5)
CRP SERPL-MCNC: 1.5 MG/DL — HIGH (ref 0–0.4)
GLUCOSE SERPL-MCNC: 72 MG/DL — SIGNIFICANT CHANGE UP (ref 70–99)
POTASSIUM SERPL-MCNC: 4.6 MMOL/L — SIGNIFICANT CHANGE UP (ref 3.5–5)
POTASSIUM SERPL-SCNC: 4.6 MMOL/L — SIGNIFICANT CHANGE UP (ref 3.5–5)
PROT SERPL-MCNC: 6.6 G/DL — SIGNIFICANT CHANGE UP (ref 6–8)
RBC # BLD: 1.99 M/UL — LOW (ref 4.2–5.4)
RETICS #: 476 K/UL — HIGH (ref 25–125)
RETICS/RBC NFR: 23.9 % — HIGH (ref 0.5–1.5)
SODIUM SERPL-SCNC: 137 MMOL/L — SIGNIFICANT CHANGE UP (ref 135–146)

## 2019-05-13 RX ORDER — LOPERAMIDE HCL 2 MG
4 TABLET ORAL ONCE
Refills: 0 | Status: COMPLETED | OUTPATIENT
Start: 2019-05-13 | End: 2019-05-13

## 2019-05-13 RX ORDER — SODIUM BICARBONATE 1 MEQ/ML
650 SYRINGE (ML) INTRAVENOUS THREE TIMES A DAY
Refills: 0 | Status: DISCONTINUED | OUTPATIENT
Start: 2019-05-13 | End: 2019-05-13

## 2019-05-13 RX ORDER — SODIUM BICARBONATE 1 MEQ/ML
1 SYRINGE (ML) INTRAVENOUS
Qty: 24 | Refills: 0
Start: 2019-05-13 | End: 2019-05-20

## 2019-05-13 RX ADMIN — Medication 4 MILLIGRAM(S): at 12:57

## 2019-05-13 RX ADMIN — Medication 650 MILLIGRAM(S): at 13:10

## 2019-05-13 RX ADMIN — Medication 1 MILLIGRAM(S): at 12:57

## 2019-05-13 NOTE — DISCHARGE NOTE PROVIDER - CARE PROVIDER_API CALL
Krystyna David)  Internal Medicine  [address] [place] [address]  Phone: [phone]  Fax: [phone]  Follow Up Time:     Madhuri Cisneros)  Hematology; Internal Medicine; Medical Oncology  [address] [place] [address]  Phone: [phone]  Fax: [phone]  Follow Up Time:     Franklin Marshall)  Gastroenterology; Internal Medicine  [address] [place] [address]  Phone: [phone]  Fax: [phone]  Follow Up Time:

## 2019-05-13 NOTE — DISCHARGE NOTE PROVIDER - CARE PROVIDERS DIRECT ADDRESSES
[email or web address]

## 2019-05-13 NOTE — DISCHARGE NOTE PROVIDER - PROVIDER TOKENS
PROVIDER:[TOKEN:[[id number]]],PROVIDER:[TOKEN:[[id number]]],PROVIDER:[TOKEN:[[id number]]]

## 2019-05-13 NOTE — DISCHARGE NOTE PROVIDER - HOSPITAL COURSE
29 yo F PMHx sickle cell, presents to the ED for evaluation of  fever and myalgias. Tmax prior to arrival was 101.7.         Sepsis likely 2/2 viral infection (r/o underlined bacteria infection)    - resolved    - likely gastroenteritis given fevers and diarrhea    - c. diff negative        Hyperchloremic metabolic acidosis    - bicarb on BMP trending down    - normal anion gap    - elevate chloride    - likely GI loss from diarrhea vs dilutional acidosis    - electrolytes stable    - asymptomatic    -Dispo home on Sodium Bicarb 650mg PO Q8H         Transaminitis with hyperbilirubinemia     - trending down    - extensive work up on admission last month    - bump in transaminases on this admission likely related to sepsis    - continue to monitor and f/u GI as outpatient    - previous rise attributed to birth control, pt has since stopped        Sickle cell anemia     - Hb trending down    - no evidence of pain crisis    - c/w IVF while pt having infection to prevent precipitation of crisis    - c/w folic acid     - Hematology consult appreciated        DVT ppx: Lovenox SC    GI ppx: not indicated.     Activity: OOBC    Diet: Regular diet

## 2019-05-13 NOTE — PROGRESS NOTE ADULT - SUBJECTIVE AND OBJECTIVE BOX
ALEIDA LUCIA  28y  Female  ***My note supersedes ALL resident notes that I sign.  My corrections for their notes are in my note.***    I can be reached directly on [employer] 0430. My office number is [phone]. My personal cell number is [phone].    INTERVAL EVENTS: Here for f/u of diarrhea. Pt feels well. Ready to go home. She can eat/drink. She has no abd pain. She'll f/u w/ h/o Dr Gloria.    T(F): 98.2 (05-13-19 @ 05:31), Max: 98.7 (05-12-19 @ 21:55)  HR: 89 (05-13-19 @ 05:31) (87 - 111)  BP: 111/55 (05-13-19 @ 05:31) (110/56 - 121/73)  RR: 18 (05-13-19 @ 05:31) (18 - 18)  SpO2: --    GENERAL:  NAD  SKIN: No rashes or lesions  HEENT: Atraumatic. Normocephalic. scl a little yellow  NECK:  No JVD.   PULMONARY: Clear to ausculation bilaterally. No wheezing.  CVS: Normal S1, S2. Regular rate and rhythm. No murmurs.  ABDOMEN/GI: Soft, Nontender, Nondistended; Bowel sounds are present  EXTREMITIES:  No edema B/L LE.  NEUROLOGIC:  No motor deficit.  PSYCH: Alert & oriented x 3, normal affect    LABS:                        7.7     (    94.2   9.43  )-----------( ---------      349      ( 12 May 2019 06:10 )             21.1    (    17.8     137   (   112   (   72      05-13-19 @ 06:27  ----------------------               4.6   (   13   (   6                             -----                        0.7  Ca  9.1   Mg  --    P   --     LFT  6.6  (  6.1  (  94       05-13-19 @ 06:27  -------------------------  4.1  (  88  (  80    T fidelina 6.1   AP 88  AST 94  ALT 80  05-13-19 @ 06:27  T fidelina 4.7   AP 91  AST 99    05-12-19 @ 06:10  T fidelina 7.0         05-11-19 @ 06:31  T fidelina 7.7   AP 93      05-10-19 @ 14:31    Reticulocyte Count in AM (05.13.19 @ 06:27)    RBC Count: 1.99 M/uL    Reticulocyte Percent: 23.9 %    Absolute Reticulocytes: 476.0 K/uL    Culture - Blood (collected 05-11-19 @ 06:31)  Source: .Blood None  Preliminary Report (05-12-19 @ 18:00):    No growth to date.    Culture - Urine (collected 05-10-19 @ 14:31)  Source: .Urine Clean Catch (Midstream)  Final Report (05-11-19 @ 22:38):    No growth    Culture - Blood (collected 05-10-19 @ 14:31)  Source: .Blood Blood  Preliminary Report (05-12-19 @ 03:50):    No growth to date.    Culture - Blood (collected 05-10-19 @ 14:31)  Source: .Blood Blood  Preliminary Report (05-12-19 @ 03:50):    No growth to date.    Clostridium difficile Toxin by PCR (05.11.19 @ 16:35)    Clostridium difficile Toxin by PCR: RESULT INTERPRETATION:    Not detected - No Clostridium difficile toxins detected by amplified DNA  PCR.    C. difficile PCR test results should be interpreted only with  consideration of the patient's clinical situation and history.  This test  willdetect the presence of toxigenic C. difficile.  However it cannot be  used as the sole criteria for the diagnosis of antibiotic associated  diarrhea, antibiotic associated colitis, or pseudomembranous colitis.  Colonization with C. difficile may exceed 20% in hospital patients, the  majority of whom are without Toxigenic Clostridium Difficile disease.  Testing is generally not recommended in children below the age of 1 year,  as up to half of healthy infants are asymptomatically colonized with C.  difficile.  In addition, C. difficile PCR testing cannot be used as a  "test of cure" as dead organism nucleic acids will persist and be  detected after treatment.  Successful treatment of C. difficile disease  is determined by resolution of clinical symptoms. Method: CDPCR  TOXIGENIC CLOST.DIFFICILE NEGATIVE;  027-NAP1-B1 PRESUMPTIVE NEGATIVE.  By: Real-Time PCR (Polymerase Chain Reaction)  NOTE: This method detects the Toxin B gene (tCdB), the  binary toxin gene (CDT), and the single-base-pair  deletion at nucleotide 117 within the gene encoding  a negative regulator of toxin production (tcdC 117).  The combined presence of genes encoding Toxin B and  binary toxin and the tcdC 117 deletion has been  associated with hypervirulent C. difficile strain  known as 027/NAP1/B1, which has been associated with  severe disease outbreaks in healthcare facilities  worldwide.    C Diff by PCR Result: Negative    Ferritin, Serum (04.19.19 @ 01:04)    Ferritin, Serum: 601 ng/mL    RADIOLOGY & ADDITIONAL TESTS:  < from: US Abdomen Limited (05.12.19 @ 12:33) >  ASCITES:  None.    IMPRESSION:    Unremarkable right upper quadrant ultrasound. Postcholecystectomy    < end of copied text >    < from: MR Abdomen No Cont (04.18.19 @ 22:47) >  IMPRESSION:  1.  Post cholecystectomy with no evidence for biliary ductal dilation nor   choledocholithiasis.    2.  Diffusely signal abnormality involving the liver and bone marrow,   suggestive of iron deposition (reticuloendothelial deposition pattern).    < end of copied text >    MEDICATIONS:    acetaminophen   Tablet .. 650 milliGRAM(s) Oral every 6 hours PRN  acetaminophen   Tablet .. 650 milliGRAM(s) Oral every 6 hours PRN  aluminum hydroxide/magnesium hydroxide/simethicone Suspension 30 milliLiter(s) Oral every 6 hours PRN  enoxaparin Injectable 40 milliGRAM(s) SubCutaneous daily  folic acid 1 milliGRAM(s) Oral daily  loperamide 2 milliGRAM(s) Oral three times a day PRN  loperamide 4 milliGRAM(s) Oral once  sodium bicarbonate 650 milliGRAM(s) Oral three times a day

## 2019-05-13 NOTE — DISCHARGE NOTE PROVIDER - NSDCCPCAREPLAN_GEN_ALL_CORE_FT
PRINCIPAL DISCHARGE DIAGNOSIS  Diagnosis: Gastroenteritis  Assessment and Plan of Treatment: Likely viral. Take sodium bicarbonate orally 650 mg every 8 hours for 3 days. Follow up with hematologist Dr. Gloria as well as gastroenterologist of your choice (Dr. Marshall suggested).        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 Gloria.

## 2019-05-13 NOTE — PROGRESS NOTE ADULT - ASSESSMENT
27 yo F PMHx sickle cell, presents to the ED for evaluation of  fever and myalgias. Tmax prior to arrival was 101.7. 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 Had another fever last night 100.6     # Sepsis likely 2/2 viral infection  sepsis present on admission - resolved  likely gastroenteritis given fevers and diarrhea  c. diff negative  neeraj diet    # Hyperchloremic metabolic acidosis 2/2 diarrhea  give NaHCO3 650mg po q8 x 3 days more to replace bicarb losses    # Transaminitis with hyperbilirubinemia prob 2/2 to mixture of chr hemolysis and poss start of iron overload (hemosiderosis)  LFTs trending down  extensive work up on admission last month  f/u w/ h/o (Faizan) as outpt to eval iron stores (can also be referred to GI as needed)    # Sickle cell anemia - stable  no evidence of pain crisis  c/w folic acid   Hematology f/u outpt    # DVT ppx: Lovenox SC  # GI ppx: not indicated.     Disposition: d/c home today

## 2019-05-13 NOTE — DISCHARGE NOTE NURSING/CASE MANAGEMENT/SOCIAL WORK - NSDCDPATPORTLINK_GEN_ALL_CORE
You can access the [facility] Patient Portal, offered by [facility], by registering with the following website: [email or web address]

## 2019-05-16 DIAGNOSIS — R51 HEADACHE: ICD-10-CM

## 2019-05-16 DIAGNOSIS — K52.9 NONINFECTIVE GASTROENTERITIS AND COLITIS, UNSPECIFIED: ICD-10-CM

## 2019-05-16 DIAGNOSIS — A41.9 SEPSIS, UNSPECIFIED ORGANISM: ICD-10-CM

## 2019-05-16 DIAGNOSIS — D57.1 SICKLE-CELL DISEASE WITHOUT CRISIS: ICD-10-CM

## 2019-05-16 DIAGNOSIS — E87.2 ACIDOSIS: ICD-10-CM

## 2019-05-16 DIAGNOSIS — E83.19 OTHER DISORDERS OF IRON METABOLISM: ICD-10-CM

## 2019-05-16 DIAGNOSIS — R50.9 FEVER, UNSPECIFIED: ICD-10-CM

## 2019-05-16 LAB
CULTURE RESULTS: SIGNIFICANT CHANGE UP
SPECIMEN SOURCE: SIGNIFICANT CHANGE UP

## 2019-06-08 NOTE — ED PROVIDER NOTE - MOUTH/THROAT [+], MLM
Urine culture showing e coli,   Sending for macrobid, if having rash/hives upset stomach to call, have with food.     Thank you. Sore throat

## 2021-11-05 NOTE — PATIENT PROFILE ADULT - NSPROPATIENTLACTATING_GEN_A_NUR
Quality 226: Preventive Care And Screening: Tobacco Use: Screening And Cessation Intervention: Patient screened for tobacco use and is an ex/non-smoker
Quality 111:Pneumonia Vaccination Status For Older Adults: Pneumococcal Vaccination not Administered or Previously Received, Reason not Otherwise Specified
Detail Level: Detailed
Quality 110: Preventive Care And Screening: Influenza Immunization: Influenza Immunization previously received during influenza season
no

## 2023-02-07 ENCOUNTER — EMERGENCY (EMERGENCY)
Facility: HOSPITAL | Age: 33
LOS: 0 days | Discharge: LEFT BEFORE DISCHARGE | End: 2023-02-07
Attending: EMERGENCY MEDICINE
Payer: SUBSIDIZED

## 2023-02-07 VITALS
SYSTOLIC BLOOD PRESSURE: 111 MMHG | OXYGEN SATURATION: 99 % | HEART RATE: 83 BPM | DIASTOLIC BLOOD PRESSURE: 56 MMHG | RESPIRATION RATE: 20 BRPM | TEMPERATURE: 97 F

## 2023-02-07 DIAGNOSIS — Z90.49 ACQUIRED ABSENCE OF OTHER SPECIFIED PARTS OF DIGESTIVE TRACT: Chronic | ICD-10-CM

## 2023-02-07 DIAGNOSIS — Z90.81 ACQUIRED ABSENCE OF SPLEEN: Chronic | ICD-10-CM

## 2023-02-07 DIAGNOSIS — M54.9 DORSALGIA, UNSPECIFIED: ICD-10-CM

## 2023-02-07 DIAGNOSIS — Z53.21 PROCEDURE AND TREATMENT NOT CARRIED OUT DUE TO PATIENT LEAVING PRIOR TO BEING SEEN BY HEALTH CARE PROVIDER: ICD-10-CM

## 2023-02-07 PROBLEM — R74.0 NONSPECIFIC ELEVATION OF LEVELS OF TRANSAMINASE AND LACTIC ACID DEHYDROGENASE [LDH]: Chronic | Status: ACTIVE | Noted: 2019-05-10

## 2023-02-07 PROCEDURE — L9991: CPT
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
## 2023-09-24 NOTE — PATIENT PROFILE ADULT - NSPROMUTANXFEARADDRESSFT_GEN_A_NUR
Problem: Knowledge Deficit - Standard  Goal: Patient and family/care givers will demonstrate understanding of plan of care, disease process/condition, diagnostic tests and medications  Outcome: Progressing     Problem: Discharge Barriers/Planning  Goal: Patient's continuum of care needs are met  Outcome: Progressing     Problem: Nutrition - Standard  Goal: Patient's nutritional and fluid intake will be adequate or improve  Outcome: Progressing   The patient is Stable - Low risk of patient condition declining or worsening    Shift Goals  Clinical Goals: stable BG, increased PO intake, normalized potassium levels  Patient Goals: rest, eat  Family Goals: updates on poc    Progress made toward(s) clinical / shift goals: increased PO intake, potassium trending in desired direction, VSS, BG stable, updated on POC   n/a

## 2024-03-23 ENCOUNTER — EMERGENCY (EMERGENCY)
Facility: HOSPITAL | Age: 34
LOS: 0 days | Discharge: HOME | End: 2024-03-23
Attending: EMERGENCY MEDICINE
Payer: COMMERCIAL

## 2024-03-23 VITALS
WEIGHT: 139.99 LBS | HEART RATE: 81 BPM | OXYGEN SATURATION: 99 % | SYSTOLIC BLOOD PRESSURE: 115 MMHG | RESPIRATION RATE: 20 BRPM | DIASTOLIC BLOOD PRESSURE: 54 MMHG | TEMPERATURE: 98 F

## 2024-03-23 DIAGNOSIS — Z23 ENCOUNTER FOR IMMUNIZATION: ICD-10-CM

## 2024-03-23 DIAGNOSIS — D57.1 SICKLE-CELL DISEASE WITHOUT CRISIS: ICD-10-CM
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
## 2024-03-23 DIAGNOSIS — S61.232A PUNCTURE WOUND WITHOUT FOREIGN BODY OF RIGHT MIDDLE FINGER WITHOUT DAMAGE TO NAIL, INITIAL ENCOUNTER: ICD-10-CM
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
## 2024-03-23 DIAGNOSIS — Z90.81 ACQUIRED ABSENCE OF SPLEEN: Chronic | ICD-10-CM

## 2024-03-23 DIAGNOSIS — Z90.49 ACQUIRED ABSENCE OF OTHER SPECIFIED PARTS OF DIGESTIVE TRACT: Chronic | ICD-10-CM

## 2024-03-23 DIAGNOSIS — S61.234A PUNCTURE WOUND WITHOUT FOREIGN BODY OF RIGHT RING FINGER WITHOUT DAMAGE TO NAIL, INITIAL ENCOUNTER: ICD-10-CM

## 2024-03-23 PROCEDURE — 99284 EMERGENCY DEPT VISIT MOD MDM: CPT

## 2024-03-23 PROCEDURE — 90471 IMMUNIZATION ADMIN: CPT

## 2024-03-23 PROCEDURE — 99283 EMERGENCY DEPT VISIT LOW MDM: CPT | Mod: 25

## 2024-03-23 PROCEDURE — 90715 TDAP VACCINE 7 YRS/> IM: CPT

## 2024-03-23 RX ORDER — IBUPROFEN 200 MG
600 TABLET ORAL ONCE
Refills: 0 | Status: COMPLETED | OUTPATIENT
Start: 2024-03-23 | End: 2024-03-23

## 2024-03-23 RX ORDER — TETANUS TOXOID, REDUCED DIPHTHERIA TOXOID AND ACELLULAR PERTUSSIS VACCINE, ADSORBED 5; 2.5; 8; 8; 2.5 [IU]/.5ML; [IU]/.5ML; UG/.5ML; UG/.5ML; UG/.5ML
0.5 SUSPENSION INTRAMUSCULAR ONCE
Refills: 0 | Status: COMPLETED | OUTPATIENT
Start: 2024-03-23 | End: 2024-03-23

## 2024-03-23 RX ADMIN — Medication 600 MILLIGRAM(S): at 23:44

## 2024-03-23 RX ADMIN — Medication 1 TABLET(S): at 23:24

## 2024-03-23 RX ADMIN — TETANUS TOXOID, REDUCED DIPHTHERIA TOXOID AND ACELLULAR PERTUSSIS VACCINE, ADSORBED 0.5 MILLILITER(S): 5; 2.5; 8; 8; 2.5 SUSPENSION INTRAMUSCULAR at 23:24

## 2024-03-23 NOTE — ED ADULT NURSE NOTE - OBJECTIVE STATEMENT
pt In er for complaints of dog bite to right hand, no bleeding upon arrival to er   pt stated her dog bit her hand   dog is up to date with vaccinations   pt unsure when her last tetanus shot was

## 2024-03-23 NOTE — ED PROVIDER NOTE - CLINICAL SUMMARY MEDICAL DECISION MAKING FREE TEXT BOX
Wound cleaned and dressed.  Tetanus was updated. Pt instructed on importance of proper wound care, especially since wound is high risk for infection.  They understand that oral abx are not a replacement for proper wound care and not a guarantee to prevent infection.  Pt will return to ED if any redness, increased swelling, drainage, streaking, fevers or any other concerns.  They verbalize understanding.

## 2024-03-23 NOTE — ED PROVIDER NOTE - OBJECTIVE STATEMENT
33-year-old female with history of sickle cell anemia presents for evaluation of dog bite to right hand.  Bite was sustained from her own dog while she was trying to remove a bottle cap from his mouth.  Patient's dog is up-to-date on his vaccinations.  Patient is unsure when her last tetanus was.  Patient cleans wounds with soap, water, alcohol and peroxide.

## 2024-03-23 NOTE — ED ADULT NURSE NOTE - NSFALLUNIVINTERV_ED_ALL_ED
Bed/Stretcher in lowest position, wheels locked, appropriate side rails in place/Call bell, personal items and telephone in reach/Instruct patient to call for assistance before getting out of bed/chair/stretcher/Non-slip footwear applied when patient is off stretcher/[place] to call system/Physically safe environment - no spills, clutter or unnecessary equipment/Purposeful proactive rounding/Room/bathroom lighting operational, light cord in reach

## 2024-03-23 NOTE — ED PROVIDER NOTE - SKIN WOUND TYPE
+ puncture wound to web space between 3rd and 4th fingers, mild swelling to dorsum of hand with abrasion over 5th mc/PUNCTURE WOUND(S)

## 2024-03-23 NOTE — ED PROVIDER NOTE - PATIENT PORTAL LINK FT
You can access the FollowMyHealth Patient Portal offered by [facility] by registering at the following website: [email or web address]. By joining [employer]’s FollowMyHealth portal, you will also be able to view your health information using other applications (apps) compatible with our system.

## 2024-03-23 NOTE — ED ADULT NURSE NOTE - CAS ELECT INFOMATION PROVIDED
Assisted patient from commode to bed. Gait is steady. No distress noted.       Yessica Alvarado  08/17/20 1929
Lab @ bedside.       Joel Gallagher  08/17/20 1932
Lab called to draw pt      Dandre Diallo RN  08/17/20 1757
Patient resting in bed with call light in reach. Breathes are even and unlabored. Skin is warm and dry. Vital signs are stable. Patient denies any needs at this time.       Fiona Lee RN  08/17/20 0590
Patient states that she does not want CT of abdomen pelvis at this time because she is getting one done at [facility] this week. Dr. Aurelio Ward at bedside to discuss with patient.         Soo De La Cruz RN  08/17/20 2012
This ED  tech and Ray ED tech assisted patient up to bedside commode. Patients gait is slow and steady.       Elisabet De La Cruz  08/17/20 1919
DC instructions